# Patient Record
Sex: FEMALE | Race: WHITE | NOT HISPANIC OR LATINO | Employment: OTHER | URBAN - METROPOLITAN AREA
[De-identification: names, ages, dates, MRNs, and addresses within clinical notes are randomized per-mention and may not be internally consistent; named-entity substitution may affect disease eponyms.]

---

## 2017-02-06 ENCOUNTER — COMMUNICATION - HEALTHEAST (OUTPATIENT)
Dept: INTERNAL MEDICINE | Facility: CLINIC | Age: 62
End: 2017-02-06

## 2017-02-23 ENCOUNTER — OFFICE VISIT - HEALTHEAST (OUTPATIENT)
Dept: INTERNAL MEDICINE | Facility: CLINIC | Age: 62
End: 2017-02-23

## 2017-02-23 DIAGNOSIS — M54.2 NECK PAIN: ICD-10-CM

## 2017-02-23 ASSESSMENT — MIFFLIN-ST. JEOR: SCORE: 1168.9

## 2017-03-07 ENCOUNTER — RECORDS - HEALTHEAST (OUTPATIENT)
Dept: ADMINISTRATIVE | Facility: OTHER | Age: 62
End: 2017-03-07

## 2017-03-08 ENCOUNTER — COMMUNICATION - HEALTHEAST (OUTPATIENT)
Dept: INTERNAL MEDICINE | Facility: CLINIC | Age: 62
End: 2017-03-08

## 2017-03-09 ENCOUNTER — RECORDS - HEALTHEAST (OUTPATIENT)
Dept: ADMINISTRATIVE | Facility: OTHER | Age: 62
End: 2017-03-09

## 2017-03-24 ENCOUNTER — COMMUNICATION - HEALTHEAST (OUTPATIENT)
Dept: INTERNAL MEDICINE | Facility: CLINIC | Age: 62
End: 2017-03-24

## 2017-04-12 ENCOUNTER — COMMUNICATION - HEALTHEAST (OUTPATIENT)
Dept: SCHEDULING | Facility: CLINIC | Age: 62
End: 2017-04-12

## 2017-04-24 ENCOUNTER — COMMUNICATION - HEALTHEAST (OUTPATIENT)
Dept: INTERNAL MEDICINE | Facility: CLINIC | Age: 62
End: 2017-04-24

## 2017-10-17 ENCOUNTER — AMBULATORY - HEALTHEAST (OUTPATIENT)
Dept: NURSING | Facility: CLINIC | Age: 62
End: 2017-10-17

## 2017-10-17 DIAGNOSIS — Z23 NEED FOR VACCINATION: ICD-10-CM

## 2017-10-23 ENCOUNTER — COMMUNICATION - HEALTHEAST (OUTPATIENT)
Dept: INTERNAL MEDICINE | Facility: CLINIC | Age: 62
End: 2017-10-23

## 2017-11-26 ENCOUNTER — COMMUNICATION - HEALTHEAST (OUTPATIENT)
Dept: INTERNAL MEDICINE | Facility: CLINIC | Age: 62
End: 2017-11-26

## 2017-11-26 DIAGNOSIS — E78.5 HYPERLIPIDEMIA: ICD-10-CM

## 2017-12-20 ENCOUNTER — RECORDS - HEALTHEAST (OUTPATIENT)
Dept: ADMINISTRATIVE | Facility: OTHER | Age: 62
End: 2017-12-20

## 2017-12-28 ENCOUNTER — COMMUNICATION - HEALTHEAST (OUTPATIENT)
Dept: INTERNAL MEDICINE | Facility: CLINIC | Age: 62
End: 2017-12-28

## 2017-12-29 ENCOUNTER — OFFICE VISIT - HEALTHEAST (OUTPATIENT)
Dept: INTERNAL MEDICINE | Facility: CLINIC | Age: 62
End: 2017-12-29

## 2017-12-29 DIAGNOSIS — E03.9 HYPOTHYROIDISM: ICD-10-CM

## 2017-12-29 DIAGNOSIS — Z00.00 PREVENTATIVE HEALTH CARE: ICD-10-CM

## 2017-12-29 DIAGNOSIS — C50.919 BREAST CANCER (H): ICD-10-CM

## 2017-12-29 DIAGNOSIS — E78.5 HYPERLIPIDEMIA: ICD-10-CM

## 2017-12-29 DIAGNOSIS — J32.9 SINUSITIS: ICD-10-CM

## 2017-12-29 LAB
CHOLEST SERPL-MCNC: 228 MG/DL
FASTING STATUS PATIENT QL REPORTED: YES
HBA1C MFR BLD: 5.9 % (ref 3.5–6)
HDLC SERPL-MCNC: 67 MG/DL
LDLC SERPL CALC-MCNC: 137 MG/DL
TRIGL SERPL-MCNC: 122 MG/DL

## 2017-12-29 ASSESSMENT — MIFFLIN-ST. JEOR: SCORE: 1177.59

## 2018-01-04 ENCOUNTER — COMMUNICATION - HEALTHEAST (OUTPATIENT)
Dept: INTERNAL MEDICINE | Facility: CLINIC | Age: 63
End: 2018-01-04

## 2018-01-30 ENCOUNTER — RECORDS - HEALTHEAST (OUTPATIENT)
Dept: ADMINISTRATIVE | Facility: OTHER | Age: 63
End: 2018-01-30

## 2018-01-30 ENCOUNTER — OFFICE VISIT - HEALTHEAST (OUTPATIENT)
Dept: INTERNAL MEDICINE | Facility: CLINIC | Age: 63
End: 2018-01-30

## 2018-01-30 DIAGNOSIS — J06.9 URI (UPPER RESPIRATORY INFECTION): ICD-10-CM

## 2018-01-30 LAB — PAP SMEAR - HIM PATIENT REPORTED: NORMAL

## 2018-01-30 ASSESSMENT — MIFFLIN-ST. JEOR: SCORE: 1204.63

## 2018-01-31 ENCOUNTER — RECORDS - HEALTHEAST (OUTPATIENT)
Dept: ADMINISTRATIVE | Facility: OTHER | Age: 63
End: 2018-01-31

## 2018-02-02 ENCOUNTER — RECORDS - HEALTHEAST (OUTPATIENT)
Dept: ADMINISTRATIVE | Facility: OTHER | Age: 63
End: 2018-02-02

## 2018-02-12 ENCOUNTER — RECORDS - HEALTHEAST (OUTPATIENT)
Dept: ADMINISTRATIVE | Facility: OTHER | Age: 63
End: 2018-02-12

## 2018-02-14 ENCOUNTER — RECORDS - HEALTHEAST (OUTPATIENT)
Dept: ADMINISTRATIVE | Facility: OTHER | Age: 63
End: 2018-02-14

## 2018-02-18 ENCOUNTER — COMMUNICATION - HEALTHEAST (OUTPATIENT)
Dept: INTERNAL MEDICINE | Facility: CLINIC | Age: 63
End: 2018-02-18

## 2018-02-18 DIAGNOSIS — E03.9 HYPOTHYROIDISM: ICD-10-CM

## 2018-04-13 ENCOUNTER — AMBULATORY - HEALTHEAST (OUTPATIENT)
Dept: INTERNAL MEDICINE | Facility: CLINIC | Age: 63
End: 2018-04-13

## 2018-04-13 ENCOUNTER — COMMUNICATION - HEALTHEAST (OUTPATIENT)
Dept: INTERNAL MEDICINE | Facility: CLINIC | Age: 63
End: 2018-04-13

## 2018-10-19 ENCOUNTER — AMBULATORY - HEALTHEAST (OUTPATIENT)
Dept: NURSING | Facility: CLINIC | Age: 63
End: 2018-10-19

## 2018-11-01 ENCOUNTER — RECORDS - HEALTHEAST (OUTPATIENT)
Dept: ADMINISTRATIVE | Facility: OTHER | Age: 63
End: 2018-11-01

## 2018-11-28 ENCOUNTER — RECORDS - HEALTHEAST (OUTPATIENT)
Dept: ADMINISTRATIVE | Facility: OTHER | Age: 63
End: 2018-11-28

## 2018-11-30 ENCOUNTER — AMBULATORY - HEALTHEAST (OUTPATIENT)
Dept: INTERNAL MEDICINE | Facility: CLINIC | Age: 63
End: 2018-11-30

## 2018-11-30 ENCOUNTER — OFFICE VISIT - HEALTHEAST (OUTPATIENT)
Dept: INTERNAL MEDICINE | Facility: CLINIC | Age: 63
End: 2018-11-30

## 2018-11-30 DIAGNOSIS — Z78.0 ASYMPTOMATIC POSTMENOPAUSAL STATUS: ICD-10-CM

## 2018-11-30 DIAGNOSIS — Z17.1 MALIGNANT NEOPLASM OF LEFT BREAST IN FEMALE, ESTROGEN RECEPTOR NEGATIVE, UNSPECIFIED SITE OF BREAST (H): ICD-10-CM

## 2018-11-30 DIAGNOSIS — E03.9 HYPOTHYROIDISM: ICD-10-CM

## 2018-11-30 DIAGNOSIS — E78.5 HYPERLIPIDEMIA: ICD-10-CM

## 2018-11-30 DIAGNOSIS — E78.5 HYPERLIPIDEMIA, UNSPECIFIED HYPERLIPIDEMIA TYPE: ICD-10-CM

## 2018-11-30 DIAGNOSIS — Z00.00 PREVENTATIVE HEALTH CARE: ICD-10-CM

## 2018-11-30 DIAGNOSIS — E03.9 HYPOTHYROIDISM, UNSPECIFIED TYPE: ICD-10-CM

## 2018-11-30 DIAGNOSIS — C50.912 MALIGNANT NEOPLASM OF LEFT BREAST IN FEMALE, ESTROGEN RECEPTOR NEGATIVE, UNSPECIFIED SITE OF BREAST (H): ICD-10-CM

## 2018-11-30 DIAGNOSIS — Z80.0 FAMILY HISTORY OF COLON CANCER: ICD-10-CM

## 2018-11-30 LAB
ALBUMIN SERPL-MCNC: 4 G/DL (ref 3.5–5)
ALBUMIN UR-MCNC: NEGATIVE MG/DL
ALP SERPL-CCNC: 56 U/L (ref 45–120)
ALT SERPL W P-5'-P-CCNC: 14 U/L (ref 0–45)
ANION GAP SERPL CALCULATED.3IONS-SCNC: 12 MMOL/L (ref 5–18)
APPEARANCE UR: CLEAR
AST SERPL W P-5'-P-CCNC: 20 U/L (ref 0–40)
ATRIAL RATE - MUSE: 59 BPM
BACTERIA #/AREA URNS HPF: ABNORMAL HPF
BASOPHILS # BLD AUTO: 0 THOU/UL (ref 0–0.2)
BASOPHILS NFR BLD AUTO: 1 % (ref 0–2)
BILIRUB DIRECT SERPL-MCNC: 0.2 MG/DL
BILIRUB SERPL-MCNC: 0.7 MG/DL (ref 0–1)
BILIRUB UR QL STRIP: NEGATIVE
BUN SERPL-MCNC: 13 MG/DL (ref 8–22)
CALCIUM SERPL-MCNC: 9.8 MG/DL (ref 8.5–10.5)
CHLORIDE BLD-SCNC: 104 MMOL/L (ref 98–107)
CHOLEST SERPL-MCNC: 216 MG/DL
CK SERPL-CCNC: 84 U/L (ref 30–190)
CO2 SERPL-SCNC: 25 MMOL/L (ref 22–31)
COLOR UR AUTO: YELLOW
CREAT SERPL-MCNC: 0.84 MG/DL (ref 0.6–1.1)
DIASTOLIC BLOOD PRESSURE - MUSE: NORMAL MMHG
EOSINOPHIL # BLD AUTO: 0.1 THOU/UL (ref 0–0.4)
EOSINOPHIL NFR BLD AUTO: 2 % (ref 0–6)
ERYTHROCYTE [DISTWIDTH] IN BLOOD BY AUTOMATED COUNT: 12.4 % (ref 11–14.5)
ERYTHROCYTE [SEDIMENTATION RATE] IN BLOOD BY WESTERGREN METHOD: 16 MM/HR (ref 0–20)
FASTING STATUS PATIENT QL REPORTED: YES
GFR SERPL CREATININE-BSD FRML MDRD: >60 ML/MIN/1.73M2
GLUCOSE BLD-MCNC: 93 MG/DL (ref 70–125)
GLUCOSE UR STRIP-MCNC: NEGATIVE MG/DL
HBA1C MFR BLD: 6.1 % (ref 3.5–6)
HCT VFR BLD AUTO: 40.7 % (ref 35–47)
HDLC SERPL-MCNC: 66 MG/DL
HGB BLD-MCNC: 13.1 G/DL (ref 12–16)
HGB UR QL STRIP: ABNORMAL
INTERPRETATION ECG - MUSE: NORMAL
KETONES UR STRIP-MCNC: NEGATIVE MG/DL
LDLC SERPL CALC-MCNC: 132 MG/DL
LEUKOCYTE ESTERASE UR QL STRIP: NEGATIVE
LYMPHOCYTES # BLD AUTO: 1.7 THOU/UL (ref 0.8–4.4)
LYMPHOCYTES NFR BLD AUTO: 47 % (ref 20–40)
MCH RBC QN AUTO: 30 PG (ref 27–34)
MCHC RBC AUTO-ENTMCNC: 32.2 G/DL (ref 32–36)
MCV RBC AUTO: 93 FL (ref 80–100)
MONOCYTES # BLD AUTO: 0.4 THOU/UL (ref 0–0.9)
MONOCYTES NFR BLD AUTO: 10 % (ref 2–10)
MUCOUS THREADS #/AREA URNS LPF: ABNORMAL LPF
NEUTROPHILS # BLD AUTO: 1.5 THOU/UL (ref 2–7.7)
NEUTROPHILS NFR BLD AUTO: 40 % (ref 50–70)
NITRATE UR QL: NEGATIVE
P AXIS - MUSE: 70 DEGREES
PH UR STRIP: 5.5 [PH] (ref 4.5–8)
PLATELET # BLD AUTO: 219 THOU/UL (ref 140–440)
PMV BLD AUTO: 10.5 FL (ref 8.5–12.5)
POTASSIUM BLD-SCNC: 3.8 MMOL/L (ref 3.5–5)
PR INTERVAL - MUSE: 156 MS
PROT SERPL-MCNC: 6.8 G/DL (ref 6–8)
QRS DURATION - MUSE: 76 MS
QT - MUSE: 428 MS
QTC - MUSE: 423 MS
R AXIS - MUSE: 75 DEGREES
RBC # BLD AUTO: 4.36 MILL/UL (ref 3.8–5.4)
RBC #/AREA URNS AUTO: ABNORMAL HPF
SODIUM SERPL-SCNC: 141 MMOL/L (ref 136–145)
SP GR UR STRIP: 1.01 (ref 1–1.03)
SQUAMOUS #/AREA URNS AUTO: ABNORMAL LPF
SYSTOLIC BLOOD PRESSURE - MUSE: NORMAL MMHG
T AXIS - MUSE: 32 DEGREES
TRIGL SERPL-MCNC: 89 MG/DL
TSH SERPL DL<=0.005 MIU/L-ACNC: 1.14 UIU/ML (ref 0.3–5)
UROBILINOGEN UR STRIP-ACNC: ABNORMAL
VENTRICULAR RATE- MUSE: 59 BPM
WBC #/AREA URNS AUTO: ABNORMAL HPF
WBC: 3.7 THOU/UL (ref 4–11)

## 2018-11-30 ASSESSMENT — MIFFLIN-ST. JEOR: SCORE: 1150.76

## 2018-12-03 ENCOUNTER — COMMUNICATION - HEALTHEAST (OUTPATIENT)
Dept: INTERNAL MEDICINE | Facility: CLINIC | Age: 63
End: 2018-12-03

## 2018-12-03 LAB — 25(OH)D3 SERPL-MCNC: 48.5 NG/ML (ref 30–80)

## 2018-12-05 ENCOUNTER — COMMUNICATION - HEALTHEAST (OUTPATIENT)
Dept: INTERNAL MEDICINE | Facility: CLINIC | Age: 63
End: 2018-12-05

## 2018-12-06 ENCOUNTER — AMBULATORY - HEALTHEAST (OUTPATIENT)
Dept: NURSING | Facility: CLINIC | Age: 63
End: 2018-12-06

## 2018-12-17 ENCOUNTER — RECORDS - HEALTHEAST (OUTPATIENT)
Dept: ADMINISTRATIVE | Facility: OTHER | Age: 63
End: 2018-12-17

## 2018-12-27 ENCOUNTER — COMMUNICATION - HEALTHEAST (OUTPATIENT)
Dept: INTERNAL MEDICINE | Facility: CLINIC | Age: 63
End: 2018-12-27

## 2019-02-06 ENCOUNTER — RECORDS - HEALTHEAST (OUTPATIENT)
Dept: ADMINISTRATIVE | Facility: OTHER | Age: 64
End: 2019-02-06

## 2019-03-26 ENCOUNTER — OFFICE VISIT - HEALTHEAST (OUTPATIENT)
Dept: FAMILY MEDICINE | Facility: CLINIC | Age: 64
End: 2019-03-26

## 2019-03-26 DIAGNOSIS — H92.03 OTALGIA OF BOTH EARS: ICD-10-CM

## 2019-04-12 ENCOUNTER — COMMUNICATION - HEALTHEAST (OUTPATIENT)
Dept: SCHEDULING | Facility: CLINIC | Age: 64
End: 2019-04-12

## 2019-04-12 ENCOUNTER — COMMUNICATION - HEALTHEAST (OUTPATIENT)
Dept: INTERNAL MEDICINE | Facility: CLINIC | Age: 64
End: 2019-04-12

## 2019-04-15 ENCOUNTER — AMBULATORY - HEALTHEAST (OUTPATIENT)
Dept: NURSING | Facility: CLINIC | Age: 64
End: 2019-04-15

## 2019-06-03 ENCOUNTER — COMMUNICATION - HEALTHEAST (OUTPATIENT)
Dept: INTERNAL MEDICINE | Facility: CLINIC | Age: 64
End: 2019-06-03

## 2019-06-03 DIAGNOSIS — E78.5 HYPERLIPIDEMIA: ICD-10-CM

## 2019-06-05 ENCOUNTER — COMMUNICATION - HEALTHEAST (OUTPATIENT)
Dept: INTERNAL MEDICINE | Facility: CLINIC | Age: 64
End: 2019-06-05

## 2019-06-05 DIAGNOSIS — E78.5 HYPERLIPIDEMIA: ICD-10-CM

## 2019-06-14 ENCOUNTER — OFFICE VISIT - HEALTHEAST (OUTPATIENT)
Dept: INTERNAL MEDICINE | Facility: CLINIC | Age: 64
End: 2019-06-14

## 2019-06-14 ENCOUNTER — COMMUNICATION - HEALTHEAST (OUTPATIENT)
Dept: INTERNAL MEDICINE | Facility: CLINIC | Age: 64
End: 2019-06-14

## 2019-06-14 DIAGNOSIS — K08.89 PAIN, DENTAL: ICD-10-CM

## 2019-06-14 DIAGNOSIS — R09.81 CONGESTION OF PARANASAL SINUS: ICD-10-CM

## 2019-06-14 DIAGNOSIS — E78.5 HYPERLIPIDEMIA: ICD-10-CM

## 2019-06-14 ASSESSMENT — MIFFLIN-ST. JEOR: SCORE: 1159.83

## 2019-06-16 ENCOUNTER — OFFICE VISIT - HEALTHEAST (OUTPATIENT)
Dept: FAMILY MEDICINE | Facility: CLINIC | Age: 64
End: 2019-06-16

## 2019-06-16 DIAGNOSIS — R07.0 THROAT PAIN: ICD-10-CM

## 2019-06-16 DIAGNOSIS — J01.10 ACUTE NON-RECURRENT FRONTAL SINUSITIS: ICD-10-CM

## 2019-06-16 LAB — DEPRECATED S PYO AG THROAT QL EIA: NORMAL

## 2019-06-17 LAB — GROUP A STREP BY PCR: NORMAL

## 2019-06-18 ENCOUNTER — COMMUNICATION - HEALTHEAST (OUTPATIENT)
Dept: INTERNAL MEDICINE | Facility: CLINIC | Age: 64
End: 2019-06-18

## 2019-06-18 DIAGNOSIS — E78.5 HYPERLIPIDEMIA: ICD-10-CM

## 2019-09-11 ENCOUNTER — COMMUNICATION - HEALTHEAST (OUTPATIENT)
Dept: INTERNAL MEDICINE | Facility: CLINIC | Age: 64
End: 2019-09-11

## 2019-09-11 DIAGNOSIS — E03.9 HYPOTHYROIDISM: ICD-10-CM

## 2019-10-14 ENCOUNTER — AMBULATORY - HEALTHEAST (OUTPATIENT)
Dept: NURSING | Facility: CLINIC | Age: 64
End: 2019-10-14

## 2019-12-03 ENCOUNTER — RECORDS - HEALTHEAST (OUTPATIENT)
Dept: ADMINISTRATIVE | Facility: OTHER | Age: 64
End: 2019-12-03

## 2019-12-09 ENCOUNTER — RECORDS - HEALTHEAST (OUTPATIENT)
Dept: ADMINISTRATIVE | Facility: OTHER | Age: 64
End: 2019-12-09

## 2019-12-13 ENCOUNTER — COMMUNICATION - HEALTHEAST (OUTPATIENT)
Dept: INTERNAL MEDICINE | Facility: CLINIC | Age: 64
End: 2019-12-13

## 2019-12-13 ENCOUNTER — RECORDS - HEALTHEAST (OUTPATIENT)
Dept: ADMINISTRATIVE | Facility: OTHER | Age: 64
End: 2019-12-13

## 2019-12-13 DIAGNOSIS — E03.9 HYPOTHYROIDISM: ICD-10-CM

## 2020-01-08 ENCOUNTER — AMBULATORY - HEALTHEAST (OUTPATIENT)
Dept: INTERNAL MEDICINE | Facility: CLINIC | Age: 65
End: 2020-01-08

## 2020-01-08 ENCOUNTER — OFFICE VISIT - HEALTHEAST (OUTPATIENT)
Dept: INTERNAL MEDICINE | Facility: CLINIC | Age: 65
End: 2020-01-08

## 2020-01-08 DIAGNOSIS — E03.9 HYPOTHYROIDISM, UNSPECIFIED TYPE: ICD-10-CM

## 2020-01-08 DIAGNOSIS — Z00.00 ANNUAL PHYSICAL EXAM: ICD-10-CM

## 2020-01-08 LAB
ALBUMIN SERPL-MCNC: 4.1 G/DL (ref 3.5–5)
ALBUMIN UR-MCNC: NEGATIVE MG/DL
ALP SERPL-CCNC: 62 U/L (ref 45–120)
ALT SERPL W P-5'-P-CCNC: 19 U/L (ref 0–45)
ANION GAP SERPL CALCULATED.3IONS-SCNC: 12 MMOL/L (ref 5–18)
APPEARANCE UR: CLEAR
AST SERPL W P-5'-P-CCNC: 22 U/L (ref 0–40)
ATRIAL RATE - MUSE: 61 BPM
BACTERIA #/AREA URNS HPF: ABNORMAL HPF
BASOPHILS # BLD AUTO: 0 THOU/UL (ref 0–0.2)
BASOPHILS NFR BLD AUTO: 1 % (ref 0–2)
BILIRUB DIRECT SERPL-MCNC: 0.2 MG/DL
BILIRUB SERPL-MCNC: 0.6 MG/DL (ref 0–1)
BILIRUB UR QL STRIP: NEGATIVE
BUN SERPL-MCNC: 14 MG/DL (ref 8–22)
CALCIUM SERPL-MCNC: 9.8 MG/DL (ref 8.5–10.5)
CHLORIDE BLD-SCNC: 104 MMOL/L (ref 98–107)
CHOLEST SERPL-MCNC: 241 MG/DL
CO2 SERPL-SCNC: 25 MMOL/L (ref 22–31)
COLOR UR AUTO: YELLOW
CREAT SERPL-MCNC: 0.83 MG/DL (ref 0.6–1.1)
DIASTOLIC BLOOD PRESSURE - MUSE: NORMAL
EOSINOPHIL # BLD AUTO: 0.1 THOU/UL (ref 0–0.4)
EOSINOPHIL NFR BLD AUTO: 2 % (ref 0–6)
ERYTHROCYTE [DISTWIDTH] IN BLOOD BY AUTOMATED COUNT: 12.7 % (ref 11–14.5)
FASTING STATUS PATIENT QL REPORTED: YES
GFR SERPL CREATININE-BSD FRML MDRD: >60 ML/MIN/1.73M2
GLUCOSE BLD-MCNC: 84 MG/DL (ref 70–125)
GLUCOSE UR STRIP-MCNC: NEGATIVE MG/DL
HBA1C MFR BLD: 6.2 % (ref 3.5–6)
HCT VFR BLD AUTO: 40.7 % (ref 35–47)
HDLC SERPL-MCNC: 75 MG/DL
HGB BLD-MCNC: 13.8 G/DL (ref 12–16)
HGB UR QL STRIP: ABNORMAL
INTERPRETATION ECG - MUSE: NORMAL
KETONES UR STRIP-MCNC: ABNORMAL MG/DL
LDLC SERPL CALC-MCNC: 145 MG/DL
LEUKOCYTE ESTERASE UR QL STRIP: NEGATIVE
LYMPHOCYTES # BLD AUTO: 2.2 THOU/UL (ref 0.8–4.4)
LYMPHOCYTES NFR BLD AUTO: 34 % (ref 20–40)
MCH RBC QN AUTO: 30.5 PG (ref 27–34)
MCHC RBC AUTO-ENTMCNC: 34 G/DL (ref 32–36)
MCV RBC AUTO: 90 FL (ref 80–100)
MONOCYTES # BLD AUTO: 0.5 THOU/UL (ref 0–0.9)
MONOCYTES NFR BLD AUTO: 7 % (ref 2–10)
MUCOUS THREADS #/AREA URNS LPF: ABNORMAL LPF
NEUTROPHILS # BLD AUTO: 3.7 THOU/UL (ref 2–7.7)
NEUTROPHILS NFR BLD AUTO: 56 % (ref 50–70)
NITRATE UR QL: NEGATIVE
P AXIS - MUSE: 76 DEGREES
PH UR STRIP: 5.5 [PH] (ref 5–8)
PLATELET # BLD AUTO: 254 THOU/UL (ref 140–440)
PMV BLD AUTO: 7.6 FL (ref 7–10)
POTASSIUM BLD-SCNC: 4 MMOL/L (ref 3.5–5)
PR INTERVAL - MUSE: 148 MS
PROT SERPL-MCNC: 6.9 G/DL (ref 6–8)
QRS DURATION - MUSE: 74 MS
QT - MUSE: 422 MS
QTC - MUSE: 424 MS
R AXIS - MUSE: 79 DEGREES
RBC # BLD AUTO: 4.54 MILL/UL (ref 3.8–5.4)
RBC #/AREA URNS AUTO: ABNORMAL HPF
SODIUM SERPL-SCNC: 141 MMOL/L (ref 136–145)
SP GR UR STRIP: 1.02 (ref 1–1.03)
SQUAMOUS #/AREA URNS AUTO: ABNORMAL LPF
SYSTOLIC BLOOD PRESSURE - MUSE: NORMAL
T AXIS - MUSE: 44 DEGREES
TRIGL SERPL-MCNC: 105 MG/DL
TSH SERPL DL<=0.005 MIU/L-ACNC: 1.09 UIU/ML (ref 0.3–5)
UROBILINOGEN UR STRIP-ACNC: ABNORMAL
VENTRICULAR RATE- MUSE: 61 BPM
WBC #/AREA URNS AUTO: ABNORMAL HPF
WBC: 6.5 THOU/UL (ref 4–11)

## 2020-01-08 ASSESSMENT — MIFFLIN-ST. JEOR: SCORE: 1132.8

## 2020-01-09 LAB
25(OH)D3 SERPL-MCNC: 39.7 NG/ML (ref 30–80)
25(OH)D3 SERPL-MCNC: 39.7 NG/ML (ref 30–80)

## 2020-01-16 ENCOUNTER — COMMUNICATION - HEALTHEAST (OUTPATIENT)
Dept: INTERNAL MEDICINE | Facility: CLINIC | Age: 65
End: 2020-01-16

## 2020-02-18 ENCOUNTER — COMMUNICATION - HEALTHEAST (OUTPATIENT)
Dept: INTERNAL MEDICINE | Facility: CLINIC | Age: 65
End: 2020-02-18

## 2020-03-28 ENCOUNTER — COMMUNICATION - HEALTHEAST (OUTPATIENT)
Dept: SCHEDULING | Facility: CLINIC | Age: 65
End: 2020-03-28

## 2020-04-20 ENCOUNTER — AMBULATORY - HEALTHEAST (OUTPATIENT)
Dept: INTERNAL MEDICINE | Facility: CLINIC | Age: 65
End: 2020-04-20

## 2020-04-20 ENCOUNTER — COMMUNICATION - HEALTHEAST (OUTPATIENT)
Dept: INTERNAL MEDICINE | Facility: CLINIC | Age: 65
End: 2020-04-20

## 2020-05-05 ENCOUNTER — COMMUNICATION - HEALTHEAST (OUTPATIENT)
Dept: INTERNAL MEDICINE | Facility: CLINIC | Age: 65
End: 2020-05-05

## 2020-05-05 DIAGNOSIS — E78.5 HYPERLIPIDEMIA, UNSPECIFIED HYPERLIPIDEMIA TYPE: ICD-10-CM

## 2020-08-03 ENCOUNTER — RECORDS - HEALTHEAST (OUTPATIENT)
Dept: HEALTH INFORMATION MANAGEMENT | Facility: CLINIC | Age: 65
End: 2020-08-03

## 2020-10-07 ENCOUNTER — OFFICE VISIT - HEALTHEAST (OUTPATIENT)
Dept: INTERNAL MEDICINE | Facility: CLINIC | Age: 65
End: 2020-10-07

## 2020-10-07 ENCOUNTER — AMBULATORY - HEALTHEAST (OUTPATIENT)
Dept: INTERNAL MEDICINE | Facility: CLINIC | Age: 65
End: 2020-10-07

## 2020-10-07 DIAGNOSIS — M85.80 OSTEOPENIA, UNSPECIFIED LOCATION: ICD-10-CM

## 2020-10-07 DIAGNOSIS — E78.2 MIXED HYPERLIPIDEMIA: ICD-10-CM

## 2020-10-07 DIAGNOSIS — Z00.00 HEALTHCARE MAINTENANCE: ICD-10-CM

## 2020-10-07 DIAGNOSIS — I25.10 ASCVD (ARTERIOSCLEROTIC CARDIOVASCULAR DISEASE): ICD-10-CM

## 2020-10-07 RX ORDER — SACCHAROMYCES BOULARDII 250 MG
250 CAPSULE ORAL DAILY
Status: SHIPPED | COMMUNITY
Start: 2020-10-07

## 2020-10-08 ENCOUNTER — AMBULATORY - HEALTHEAST (OUTPATIENT)
Dept: NURSING | Facility: CLINIC | Age: 65
End: 2020-10-08

## 2020-10-15 ENCOUNTER — HOSPITAL ENCOUNTER (OUTPATIENT)
Dept: CT IMAGING | Facility: CLINIC | Age: 65
Discharge: HOME | End: 2020-10-15
Attending: INTERNAL MEDICINE

## 2020-10-15 DIAGNOSIS — I25.10 ASCVD (ARTERIOSCLEROTIC CARDIOVASCULAR DISEASE): ICD-10-CM

## 2020-10-15 LAB
CV CALCIUM SCORE AGATSTON LM: 0
CV CALCIUM SCORING AGATSON LAD: 0
CV CALCIUM SCORING AGATSTON CX: 0
CV CALCIUM SCORING AGATSTON RCA: 0
CV CALCIUM SCORING AGATSTON TOTAL: 0

## 2020-10-17 ENCOUNTER — COMMUNICATION - HEALTHEAST (OUTPATIENT)
Dept: INTERNAL MEDICINE | Facility: CLINIC | Age: 65
End: 2020-10-17

## 2020-10-29 ENCOUNTER — RECORDS - HEALTHEAST (OUTPATIENT)
Dept: ADMINISTRATIVE | Facility: OTHER | Age: 65
End: 2020-10-29

## 2020-11-11 ENCOUNTER — RECORDS - HEALTHEAST (OUTPATIENT)
Dept: ADMINISTRATIVE | Facility: OTHER | Age: 65
End: 2020-11-11

## 2020-12-09 ENCOUNTER — RECORDS - HEALTHEAST (OUTPATIENT)
Dept: ADMINISTRATIVE | Facility: OTHER | Age: 65
End: 2020-12-09

## 2020-12-10 ENCOUNTER — COMMUNICATION - HEALTHEAST (OUTPATIENT)
Dept: INTERNAL MEDICINE | Facility: CLINIC | Age: 65
End: 2020-12-10

## 2020-12-10 DIAGNOSIS — E03.9 HYPOTHYROIDISM: ICD-10-CM

## 2020-12-10 RX ORDER — LEVOTHYROXINE SODIUM 50 UG/1
50 TABLET ORAL DAILY
Qty: 90 TABLET | Refills: 3 | Status: SHIPPED | OUTPATIENT
Start: 2020-12-10 | End: 2021-09-21

## 2020-12-15 ENCOUNTER — RECORDS - HEALTHEAST (OUTPATIENT)
Dept: ADMINISTRATIVE | Facility: OTHER | Age: 65
End: 2020-12-15

## 2021-02-02 ENCOUNTER — OFFICE VISIT - HEALTHEAST (OUTPATIENT)
Dept: INTERNAL MEDICINE | Facility: CLINIC | Age: 66
End: 2021-02-02

## 2021-02-02 DIAGNOSIS — Z00.00 HEALTHCARE MAINTENANCE: ICD-10-CM

## 2021-02-02 DIAGNOSIS — C50.912 MALIGNANT NEOPLASM OF LEFT BREAST IN FEMALE, ESTROGEN RECEPTOR NEGATIVE, UNSPECIFIED SITE OF BREAST (H): ICD-10-CM

## 2021-02-02 DIAGNOSIS — E03.9 HYPOTHYROIDISM, UNSPECIFIED TYPE: ICD-10-CM

## 2021-02-02 DIAGNOSIS — E78.5 HYPERLIPIDEMIA, UNSPECIFIED HYPERLIPIDEMIA TYPE: ICD-10-CM

## 2021-02-02 DIAGNOSIS — Z17.1 MALIGNANT NEOPLASM OF LEFT BREAST IN FEMALE, ESTROGEN RECEPTOR NEGATIVE, UNSPECIFIED SITE OF BREAST (H): ICD-10-CM

## 2021-02-02 DIAGNOSIS — Z00.00 ROUTINE HISTORY AND PHYSICAL EXAMINATION OF ADULT: ICD-10-CM

## 2021-02-02 DIAGNOSIS — E78.2 MIXED HYPERLIPIDEMIA: ICD-10-CM

## 2021-02-02 LAB
ALBUMIN SERPL-MCNC: 4.2 G/DL (ref 3.5–5)
ALP SERPL-CCNC: 60 U/L (ref 45–120)
ALT SERPL W P-5'-P-CCNC: 14 U/L (ref 0–45)
ANION GAP SERPL CALCULATED.3IONS-SCNC: 10 MMOL/L (ref 5–18)
AST SERPL W P-5'-P-CCNC: 18 U/L (ref 0–40)
BASOPHILS # BLD AUTO: 0 THOU/UL (ref 0–0.2)
BASOPHILS NFR BLD AUTO: 1 % (ref 0–2)
BILIRUB SERPL-MCNC: 0.5 MG/DL (ref 0–1)
BUN SERPL-MCNC: 14 MG/DL (ref 8–22)
CALCIUM SERPL-MCNC: 9.7 MG/DL (ref 8.5–10.5)
CHLORIDE BLD-SCNC: 105 MMOL/L (ref 98–107)
CHOLEST SERPL-MCNC: 225 MG/DL
CO2 SERPL-SCNC: 27 MMOL/L (ref 22–31)
CREAT SERPL-MCNC: 0.84 MG/DL (ref 0.6–1.1)
EOSINOPHIL # BLD AUTO: 0.1 THOU/UL (ref 0–0.4)
EOSINOPHIL NFR BLD AUTO: 2 % (ref 0–6)
ERYTHROCYTE [DISTWIDTH] IN BLOOD BY AUTOMATED COUNT: 12.5 % (ref 11–14.5)
FASTING STATUS PATIENT QL REPORTED: YES
GFR SERPL CREATININE-BSD FRML MDRD: >60 ML/MIN/1.73M2
GLUCOSE BLD-MCNC: 90 MG/DL (ref 70–125)
HBA1C MFR BLD: 5.9 %
HCT VFR BLD AUTO: 43.5 % (ref 35–47)
HDLC SERPL-MCNC: 64 MG/DL
HGB BLD-MCNC: 13.9 G/DL (ref 12–16)
IMM GRANULOCYTES # BLD: 0 THOU/UL
IMM GRANULOCYTES NFR BLD: 0 %
LDLC SERPL CALC-MCNC: 139 MG/DL
LYMPHOCYTES # BLD AUTO: 2 THOU/UL (ref 0.8–4.4)
LYMPHOCYTES NFR BLD AUTO: 46 % (ref 20–40)
MCH RBC QN AUTO: 29.3 PG (ref 27–34)
MCHC RBC AUTO-ENTMCNC: 32 G/DL (ref 32–36)
MCV RBC AUTO: 92 FL (ref 80–100)
MONOCYTES # BLD AUTO: 0.5 THOU/UL (ref 0–0.9)
MONOCYTES NFR BLD AUTO: 11 % (ref 2–10)
NEUTROPHILS # BLD AUTO: 1.8 THOU/UL (ref 2–7.7)
NEUTROPHILS NFR BLD AUTO: 40 % (ref 50–70)
PLATELET # BLD AUTO: 240 THOU/UL (ref 140–440)
PMV BLD AUTO: 9.4 FL (ref 7–10)
POTASSIUM BLD-SCNC: 4.5 MMOL/L (ref 3.5–5)
PROT SERPL-MCNC: 7.2 G/DL (ref 6–8)
RBC # BLD AUTO: 4.74 MILL/UL (ref 3.8–5.4)
SODIUM SERPL-SCNC: 142 MMOL/L (ref 136–145)
TRIGL SERPL-MCNC: 112 MG/DL
TSH SERPL DL<=0.005 MIU/L-ACNC: 1.23 UIU/ML (ref 0.3–5)
WBC: 4.4 THOU/UL (ref 4–11)

## 2021-02-02 RX ORDER — ROSUVASTATIN CALCIUM 5 MG/1
5 TABLET, COATED ORAL DAILY
Qty: 90 TABLET | Refills: 3 | Status: SHIPPED | OUTPATIENT
Start: 2021-02-02 | End: 2022-02-02

## 2021-02-02 ASSESSMENT — MIFFLIN-ST. JEOR: SCORE: 1164.36

## 2021-02-03 LAB
25(OH)D3 SERPL-MCNC: 47.5 NG/ML (ref 30–80)
25(OH)D3 SERPL-MCNC: 47.5 NG/ML (ref 30–80)

## 2021-03-23 ENCOUNTER — COMMUNICATION - HEALTHEAST (OUTPATIENT)
Dept: INTERNAL MEDICINE | Facility: CLINIC | Age: 66
End: 2021-03-23

## 2021-03-27 ENCOUNTER — AMBULATORY - HEALTHEAST (OUTPATIENT)
Dept: NURSING | Facility: CLINIC | Age: 66
End: 2021-03-27

## 2021-04-17 ENCOUNTER — AMBULATORY - HEALTHEAST (OUTPATIENT)
Dept: NURSING | Facility: CLINIC | Age: 66
End: 2021-04-17

## 2021-05-24 ENCOUNTER — RECORDS - HEALTHEAST (OUTPATIENT)
Dept: ADMINISTRATIVE | Facility: CLINIC | Age: 66
End: 2021-05-24

## 2021-05-25 ENCOUNTER — RECORDS - HEALTHEAST (OUTPATIENT)
Dept: ADMINISTRATIVE | Facility: CLINIC | Age: 66
End: 2021-05-25

## 2021-05-26 ENCOUNTER — RECORDS - HEALTHEAST (OUTPATIENT)
Dept: ADMINISTRATIVE | Facility: CLINIC | Age: 66
End: 2021-05-26

## 2021-05-26 VITALS — HEART RATE: 89 BPM | DIASTOLIC BLOOD PRESSURE: 76 MMHG | SYSTOLIC BLOOD PRESSURE: 127 MMHG

## 2021-05-27 ENCOUNTER — RECORDS - HEALTHEAST (OUTPATIENT)
Dept: ADMINISTRATIVE | Facility: CLINIC | Age: 66
End: 2021-05-27

## 2021-05-27 NOTE — TELEPHONE ENCOUNTER
Spoke with the patient and helped her to schedule shingrix vaccine for 4/15/19 at 9:30 am.  Patient had no further questions at this time.  Sonja ERNANDEZ CMA/DICKSON....................3:25 PM

## 2021-05-27 NOTE — PROGRESS NOTES
Immunizations     Name       ZOSTER, RECOMBINANT, IM       Patient in clinic today for her 2nd shingrix vaccine.  Patient tolerated the vaccine well and had no further questions at this time.  Sonja ERNANDEZ CMA/DICKSON....................9:37 AM

## 2021-05-27 NOTE — TELEPHONE ENCOUNTER
Question;    What is the maximum time she can wait until she gets her 2nd shingrix  Vaccine.    2-6 months      patient notified.      Yoana Cowart RN  [facility] Triage/refill nurse

## 2021-05-27 NOTE — PROGRESS NOTES
Chief Complaint   Patient presents with     Ear Pain     x1, unsusal pain, itchy, both ears, worst on the right ear         HPI      Patient is here for a few days of bilateral ear pain R>L. No fever, cough, nasal congestion, sore throat. No pain during visit.    ROS: Pertinent ROS noted in HPI.     Allergies   Allergen Reactions     Lipitor [Atorvastatin] Myalgia     Statins-Hmg-Coa Reductase Inhibitors Myalgia       Patient Active Problem List   Diagnosis     Hyperlipidemia     Postmenopausal     Breast cancer (H)       Family History   Problem Relation Age of Onset     Heart attack Father      Diabetes Mother      Osteoarthritis Mother      Cancer Sister         breast     Hyperlipidemia Sister      Hypothyroidism Sister      Hyperlipidemia Brother        Social History     Socioeconomic History     Marital status:      Spouse name: Not on file     Number of children: Not on file     Years of education: Not on file     Highest education level: Not on file   Occupational History     Not on file   Social Needs     Financial resource strain: Not on file     Food insecurity:     Worry: Not on file     Inability: Not on file     Transportation needs:     Medical: Not on file     Non-medical: Not on file   Tobacco Use     Smoking status: Never Smoker     Smokeless tobacco: Never Used   Substance and Sexual Activity     Alcohol use: No     Drug use: No     Sexual activity: Not on file   Lifestyle     Physical activity:     Days per week: Not on file     Minutes per session: Not on file     Stress: Not on file   Relationships     Social connections:     Talks on phone: Not on file     Gets together: Not on file     Attends [employer] service: Not on file     Active member of club or organization: Not on file     Attends meetings of clubs or organizations: Not on file     Relationship status: Not on file     Intimate partner violence:     Fear of current or ex partner: Not on file     Emotionally abused: Not on file      Physically abused: Not on file     Forced sexual activity: Not on file   Other Topics Concern     Not on file   Social History Narrative    Adin-diabetes    Kena Ogden    Native of [facility]                 Objective:    Vitals:    03/26/19 0950   BP: 149/87   Pulse: 61   Resp: 18   Temp: 98  F (36.7  C)   SpO2: 97%       Gen:NAD  Throat: oropharynx clear, tonsils normal  Ears: TMs clear without effusion, ear canals normal with small cerumen  Nose: no discharge  Neck: No adenopathy  CV: RRR, normal S1S2, no M, R, G  Pulm: CTAB, normal effort    Assessment:    Otalgia of both ears - normal exam.     Plan:    Patient was reassured there is no pathologies noted in her ears.   Advised OTC analgesics prn.  F/u with PCP if symptoms persist/escalate.

## 2021-05-29 NOTE — PROGRESS NOTES
Internal Medicine Office Visit  [facility]  Patient Name: Skylar Spaulding  Patient Age: 63 y.o.  YOB: 1955  MRN: [id number]    Date of Visit: 2019  Reason for Office Visit:   Chief Complaint   Patient presents with     Sinus Problem     Facial pain and pressure started on the right side of face and runny nose. Headaches, ear pain/popping, and sore throat. Felt warm but temp was normal at home.            Assessment / Plan / Medical Decision Makin. Pain, dental  - amoxicillin (AMOXIL) 875 MG tablet; Take 1 tablet (875 mg total) by mouth 2 (two) times a day for 10 days.  Dispense: 20 tablet; Refill: 0  Recommend that patient contact her dentist for possible imaging and evaluation  2. Congestion of paranasal sinus  Recommend saline sinus rinse, fluticasone nasal spray 1 spray each nostril daily, she certainly may utilize an over-the-counter decongestant utilization of over-the-counter pain reliever as needed    Patient advised if symptoms persist, worsen or new symptoms arise they are to seek medical care.  All patients questions addressed. Patient verbalized understanding and agreement with plan.     No orders of the defined types were placed in this encounter.  Followup: Return in about 1 week (around 2019), or if symptoms worsen or fail to improve. earlier if needed.    Health Maintenance Review  Health Maintenance   Topic Date Due     ADVANCE DIRECTIVES DISCUSSED WITH PATIENT  10/01/2019     PAP SMEAR  10/30/2019     MAMMOGRAM  2020     TD 18+ HE  10/31/2022     COLONOSCOPY  10/10/2023     INFLUENZA VACCINE RULE BASED  Completed     TDAP ADULT ONE TIME DOSE  Completed     ZOSTER VACCINES  Completed         I am having Skylar Spaulding start on amoxicillin. I am also having her maintain her aspirin, cholecalciferol (vitamin D3), calcium carbonate-vitamin D2, multivitamin therapeutic, levothyroxine, and rosuvastatin.      HPI:  Skylar HOPSON  aJsson is a 63 y.o. year old who presents to the office today with a chief concern of facial pain pressure began on the right side of the face, runny nose, headache, ears popping and pain sore throat.  Patient reports feeling warm but no temp was noted at home today.  Patient reports that initially started over the weekend she is unsure if it is some dental pain is been greater than 6 months and she is been seen by the dentist and did have some work done at that time.  She states that her nose runs clear nasal drainage postnasal drainage with sore throat, she is to utilize some over-the-counter DayQuil does not find relief of symptoms has utilize some ibuprofen does find some relief with her headache and dental discomfort        Review of Systems- pertinent positive in bold:  Constitutional: Fever, chills, night sweats, fainting, weight change, fatigue, dizziness, sleeping difficulties, loud snoring/pauses in breathing  Eyes: change in vision, blurred or double vision, redness/eye pain  Ears, nose, mouth, throat: change in hearing, ear pain, hoarseness, difficulty swallowing, sores in the mouth or throat  Respiratory: shortness of breath, cough, bloody sputum, wheezing  Cardiovascular: chest pain, palpitations   Skin: change in moles/freckles, rash, nodules        Current Scheduled Meds:  Outpatient Encounter Medications as of 6/14/2019   Medication Sig Dispense Refill     aspirin 81 MG EC tablet Take 81 mg by mouth daily.       calcium carbonate-vitamin D2 500 mg(1,250mg) -200 unit tablet Take 1 tablet by mouth 3 (three) times a day.       cholecalciferol, vitamin D3, (VITAMIN D3) 2,000 unit cap Take 1,000 Units by mouth daily.        levothyroxine (SYNTHROID, LEVOTHROID) 50 MCG tablet TAKE 1 TABLET BY MOUTH DAILY. 100 tablet 2     multivitamin therapeutic (THERAGRAN) tablet Take 1 tablet by mouth daily.       rosuvastatin (CRESTOR) 10 MG tablet TAKE 0.5 TABLETS BY MOUTH EVERY DAY 45 tablet 1     amoxicillin  "(AMOXIL) 875 MG tablet Take 1 tablet (875 mg total) by mouth 2 (two) times a day for 10 days. 20 tablet 0     No facility-administered encounter medications on file as of 6/14/2019.      Past Medical History:   Diagnosis Date     Benign positional vertigo      Breast cancer (H)      ER- [place]+ carcinoma of breast (H)      Hyperlipidemia      Menopause      Microscopic hematuria      Osteoarthritis      Past Surgical History:   Procedure Laterality Date     BREAST BIOPSY      Left     BREAST LUMPECTOMY      left     DENTAL SURGERY       POLYPECTOMY      uterine polyp      Social History     Tobacco Use     Smoking status: Never Smoker     Smokeless tobacco: Never Used   Substance Use Topics     Alcohol use: No     Drug use: No       Objective / Physical Examination:  Vitals:    06/14/19 0818   BP: 138/82   Pulse: 67   Resp: 16   Temp: 97.6  F (36.4  C)   SpO2: 98%   Weight: 136 lb (61.7 kg)   Height: 5' 4.5\" (1.638 m)     Wt Readings from Last 3 Encounters:   06/14/19 136 lb (61.7 kg)   03/26/19 133 lb 5 oz (60.5 kg)   11/30/18 134 lb (60.8 kg)     Body mass index is 22.98 kg/m .     General Appearance: Alert and oriented, cooperative, affect appropriate, speech clear, in no apparent distress  Head: Normocephalic, atraumatic  Ears: Tympanic membrane clear with landmarks well visualized bilaterally  Eyes:   Conjunctivae clear and sclerae non-icteric  Nose: Septum midline, nares patent,  mucosa moist clear nasal drainage noted  Throat: Lips and mucosa moist, posterior pharynx mild erythema without exudate postnasal drainage noted.  Patient does note discomfort along the upper right dental  Neck: Supple, trachea midline. No cervical adenopathy  Lungs: Clear to auscultation bilaterally. No adventitious lung sounds noted. Normal inspiratory and expiratory effort  Cardiovascular: Regular rate, normal S1, S2. No murmurs, rubs, or gallops  Integumentary: Warm and dry. Without suspicious looking lesions  Neuro: Alert and " oriented, follows commands appropriately.     No results found.  No results found for this or any previous visit (from the past 240 hour(s)).          Clover Guo, CNP

## 2021-05-29 NOTE — TELEPHONE ENCOUNTER
Refill Approved    Rx renewed per Medication Renewal Policy. Medication was last renewed on 11/30/18.    Abigail Kerr, [facility] Triage/Med Refill 6/4/2019     Requested Prescriptions   Pending Prescriptions Disp Refills     rosuvastatin (CRESTOR) 10 MG tablet [Pharmacy Med Name: ROSUVASTATIN 10MG TABLETS] 100 tablet 0     Sig: TAKE 0.5 TABLETS BY MOUTH EVERY DAY       Statins Refill Protocol (Hmg CoA Reductase Inhibitors) Passed - 6/3/2019  3:30 PM        Passed - PCP or prescribing provider visit in past 12 months      Last office visit with prescriber/PCP: 2/23/2017 Royal Faith MD OR same dept: Visit date not found OR same specialty: 1/30/2018 Wolf Casillas MD  Last physical: 11/30/2018 Last MTM visit: Visit date not found   Next visit within 3 mo: Visit date not found  Next physical within 3 mo: Visit date not found  Prescriber OR PCP: Royal Faith MD  Last diagnosis associated with med order: 1. Hyperlipidemia  - rosuvastatin (CRESTOR) 10 MG tablet [Pharmacy Med Name: ROSUVASTATIN 10MG TABLETS]; TAKE 0.5 TABLETS BY MOUTH EVERY DAY  Dispense: 100 tablet; Refill: 0    If protocol passes may refill for 12 months if within 3 months of last provider visit (or a total of 15 months).

## 2021-05-29 NOTE — TELEPHONE ENCOUNTER
RN cannot approve Refill Request    RN can NOT refill this medication The source prescription was reordered on 6/4/2019 by Abigail Kerr RN.. Last office visit: 2/23/2017 Royal Faith MD Last Physical: 11/30/2018 Last MTM visit: Visit date not found Last visit same specialty: 6/14/2019 Clover Guo CNP.  Next visit within 3 mo: Visit date not found  Next physical within 3 mo: Visit date not found      Kade Hicks [facility] Triage/Med Refill 6/16/2019    Requested Prescriptions   Pending Prescriptions Disp Refills     rosuvastatin (CRESTOR) 10 MG tablet [Pharmacy Med Name: ROSUVASTATIN 10MG TABLETS] 100 tablet 0     Sig: TAKE 0.5 TABLETS BY MOUTH EVERY DAY       Statins Refill Protocol (Hmg CoA Reductase Inhibitors) Passed - 6/14/2019  9:04 AM        Passed - PCP or prescribing provider visit in past 12 months      Last office visit with prescriber/PCP: 2/23/2017 Royal Faith MD OR same dept: Visit date not found OR same specialty: 6/14/2019 Clover Guo CNP  Last physical: 11/30/2018 Last MTM visit: Visit date not found   Next visit within 3 mo: Visit date not found  Next physical within 3 mo: Visit date not found  Prescriber OR PCP: Royal Faith MD  Last diagnosis associated with med order: 1. Hyperlipidemia  - rosuvastatin (CRESTOR) 10 MG tablet [Pharmacy Med Name: ROSUVASTATIN 10MG TABLETS]; TAKE 0.5 TABLETS BY MOUTH EVERY DAY  Dispense: 100 tablet; Refill: 0    If protocol passes may refill for 12 months if within 3 months of last provider visit (or a total of 15 months).

## 2021-05-30 VITALS — WEIGHT: 138 LBS | BODY MASS INDEX: 22.99 KG/M2 | HEIGHT: 65 IN

## 2021-05-31 VITALS — BODY MASS INDEX: 24.16 KG/M2 | HEIGHT: 65 IN | WEIGHT: 145 LBS

## 2021-05-31 VITALS — WEIGHT: 139.04 LBS | BODY MASS INDEX: 23.17 KG/M2 | HEIGHT: 65 IN

## 2021-06-01 NOTE — TELEPHONE ENCOUNTER
Refill Approved    Rx renewed per Medication Renewal Policy. Medication was last renewed on 11/30/18.    Lynne Lozada, [facility] Triage/Med Refill 9/12/2019     Requested Prescriptions   Pending Prescriptions Disp Refills     levothyroxine (SYNTHROID, LEVOTHROID) 50 MCG tablet 100 tablet 2     Sig: TAKE 1 TABLET BY MOUTH DAILY       Thyroid Hormones Protocol Passed - 9/11/2019  2:15 PM        Passed - Provider visit in past 12 months or next 3 months     Last office visit with prescriber/PCP: 2/23/2017 Royal Faith MD OR same dept: Visit date not found OR same specialty: 6/14/2019 Clover Guo, CNP  Last physical: 11/30/2018 Last MTM visit: Visit date not found   Next visit within 3 mo: Visit date not found  Next physical within 3 mo: Visit date not found  Prescriber OR PCP: Royla Faith MD  Last diagnosis associated with med order: 1. Hypothyroidism  - levothyroxine (SYNTHROID, LEVOTHROID) 50 MCG tablet; TAKE 1 TABLET BY MOUTH DAILY  Dispense: 100 tablet; Refill: 2    If protocol passes may refill for 12 months if within 3 months of last provider visit (or a total of 15 months).             Passed - TSH on file in past 12 months for patient age 12 & older     TSH   Date Value Ref Range Status   11/30/2018 1.14 0.30 - 5.00 uIU/mL Final

## 2021-06-02 VITALS — WEIGHT: 134 LBS | BODY MASS INDEX: 22.33 KG/M2 | HEIGHT: 65 IN

## 2021-06-02 VITALS — BODY MASS INDEX: 22.53 KG/M2 | WEIGHT: 133.31 LBS

## 2021-06-03 VITALS — WEIGHT: 138 LBS | BODY MASS INDEX: 23.32 KG/M2

## 2021-06-03 VITALS — HEIGHT: 65 IN | WEIGHT: 136 LBS | BODY MASS INDEX: 22.66 KG/M2

## 2021-06-04 VITALS
HEART RATE: 66 BPM | BODY MASS INDEX: 21.67 KG/M2 | SYSTOLIC BLOOD PRESSURE: 136 MMHG | WEIGHT: 130.04 LBS | OXYGEN SATURATION: 98 % | DIASTOLIC BLOOD PRESSURE: 82 MMHG | HEIGHT: 65 IN

## 2021-06-04 NOTE — TELEPHONE ENCOUNTER
RN cannot approve Refill Request    RN can NOT refill this medication Protocol failed and NO refill given.       Abigail Kerr, [facility] Triage/Med Refill 12/15/2019    Requested Prescriptions   Pending Prescriptions Disp Refills     levothyroxine (SYNTHROID, LEVOTHROID) 50 MCG tablet [Pharmacy Med Name: LEVOTHYROXINE 0.05MG (50MCG) TAB] 90 tablet 0     Sig: TAKE 1 TABLET BY MOUTH DAILY       Thyroid Hormones Protocol Failed - 12/13/2019  1:23 PM        Failed - Provider visit in past 12 months or next 3 months     Last office visit with prescriber/PCP: 2/23/2017 Royal Faith MD OR same dept: Visit date not found OR same specialty: 6/14/2019 Clover Guo, CNP  Last physical: 11/30/2018 Last MTM visit: Visit date not found   Next visit within 3 mo: Visit date not found  Next physical within 3 mo: Visit date not found  Prescriber OR PCP: Royal Faith MD  Last diagnosis associated with med order: 1. Hypothyroidism  - levothyroxine (SYNTHROID, LEVOTHROID) 50 MCG tablet [Pharmacy Med Name: LEVOTHYROXINE 0.05MG (50MCG) TAB]; TAKE 1 TABLET BY MOUTH DAILY  Dispense: 90 tablet; Refill: 0    If protocol passes may refill for 12 months if within 3 months of last provider visit (or a total of 15 months).             Failed - TSH on file in past 12 months for patient age 12 & older     TSH   Date Value Ref Range Status   11/30/2018 1.14 0.30 - 5.00 uIU/mL Final

## 2021-06-05 VITALS
SYSTOLIC BLOOD PRESSURE: 124 MMHG | WEIGHT: 133.5 LBS | HEIGHT: 66 IN | BODY MASS INDEX: 21.45 KG/M2 | OXYGEN SATURATION: 99 % | HEART RATE: 76 BPM | DIASTOLIC BLOOD PRESSURE: 78 MMHG

## 2021-06-05 NOTE — PATIENT INSTRUCTIONS - HE
Combined calcium and vitamin D supplementation appears to reduce fracture risk in older adults.  ?The optimal intake of calcium and vitamin D in postmenopausal women with osteoporosis, 1500 mg of calcium daily (total diet plus supplement) in divided doses and 7889-4691 international units of vitamin D daily are advised.        Foods and drinks with calcium     Food Calcium, milligrams   Milk (skim, 2 percent, or whole, 8 oz [240 mL]) 300   Yogurt (6 oz [168 g]) 250   Orange juice (with calcium, 8 oz [240 mL]) 300   Tofu with calcium (1/2 cup [113 g]) 435   Cheese (1 oz [28 g]) 195 to 335 (hard cheese = higher calcium)   Cottage cheese (1/2 cup [113 g]) 130   Ice cream or frozen yogurt (1/2 cup [113 g]) 100   Soy milk (8 oz [240 mL]) 300   Beans (1/2 cup cooked [113 g]) 60 to 80   Dark, leafy green vegetables (1/2 cup cooked [113 g]) 50 to 135   Almonds (24 whole) 70   Orange (1 medium) 60        Selected food sources of vitamin D   Food International units per serving   Cod liver oil, 1 tablespoon (15 mL) 1360   [place] (sockeye), cooked, 3 ounces (85 g) 794   Mushrooms that have been exposed to ultraviolet light to increase vitamin D, 3 ounces (85 g) (not yet commonly available) 400   Mackerel, cooked, 3 ounces (85 g) 388   Tuna fish, canned in water, drained, 3 ounces (85 g) 154   Milk, nonfat, reduced fat, and whole, vitamin D-fortified, 8 ounces (240 mL) 115 to 124   Orange juice fortified with vitamin D, 8 ounces (240 mL) (check product labels, as amount of added vitamin D varies) 100   Yogurt, fortified with 20 percent of the DV for vitamin D, 6 ounces (180 mL) (more heavily fortified yogurts provide more of the DV) 80   Margarine, fortified, 1 tablespoon (15 g) 60   Sardines, canned in oil, drained, 2 sardines 46   Liver, beef, cooked, 3.5 ounces (100 g) 46   Ready-to-eat cereal, fortified with 10 percent of the DV for vitamin D, 6 to 8 ounces (227 g) (more heavily fortified cereals might provide more of the  DV) 40   Egg, 1 whole (vitamin D is found in yolk) 25   Cheese, Swiss, 1 ounce (29 g) 6     Elemental calcium and Vitamin D content per pill of different calcium supplements    Elemental Ca/tablet Ca compound Vitamin D   Caltrate 600 + D3 600 mg Carbonate 800 units   Caltrate 600 + D3 Soft Chews  600 mg  Carbonate  800 units    Caltrate Gummy Bites 250 mg  Tribasic calcium phosphate 400 units   Caltrate 600 + D3 Plus Minerals Chewables  600 mg Carbonate 800 units   Caltrate 600 + D3 Plus Minerals Minis 300 mg Carbonate 800 units   Citracal Petites  200 mg  Citrate 250 units   Citracal Maximum  315 mg Citrate 250 units   Citracal Plus Magnesium & Minerals 250 mg  Citrate 125 units   Citracal + D Slow Release  600 mg Citrate 500 units   Citracal Calcium Gummies 250 mg Tricalcium phosphate 500 units   Citracal Calcium Pearls  200 mg  Carbonate 500 units   OsCal Calcium + D3 500 mg Carbonate 200 units   Oscal Extra + D3 500 mg Carbonate 600 units   Oscal Ultra 600 mg Carbonate 500 units   Oscal Chewable  500  Carbonate 600 units   Tums 200 mg Carbonate -   Tums Extra Strength 300 mg Carbonate -   Tums Ultra Strength 400 mg Carbonate -   Tums Chewy Delights  400 mg  Carbonate -   Viactiv Calcium plus D + K 500 mg Carbonate 500 units  (or 1000 units in sugar-free)   Units: international units.         website  nof.org

## 2021-06-05 NOTE — PROGRESS NOTES
HISTORY/PHYSICAL EXAM  Skylar Spaulding   64 y.o. female  Is here for a physical healthcare maintenance examination        Assessment/Plan for  Skylar Spaulding is a 64 y.o. female.       1.  Healthcare maintenance examination-healthy woman healthy lifestyle  2.  Distant history of breast cancer-ER NE positive.  Followed by oncology annually  3.  Menopausal-followed by GYN  4.  Hyperlipidemia with normal coronary calcium score on Crestor.  5.  Family history of coronary disease     Plan:  1.  Continue current Rx    2.  CT colonography.  Recommended by colorectal.  She had a very tortuous colon with multiple diverticulosis and Dr. Alma Ellis suggested she not undergo colonoscopy in the future.  3.  Routine labs including urinalysis  4.  Routine vitamin D calcium supplement discussed        Patient Instructions       Combined calcium and vitamin D supplementation appears to reduce fracture risk in older adults.  ?The optimal intake of calcium and vitamin D in postmenopausal women with osteoporosis, 1500 mg of calcium daily (total diet plus supplement) in divided doses and 6315-7192 international units of vitamin D daily are advised.        Foods and drinks with calcium     Food Calcium, milligrams   Milk (skim, 2 percent, or whole, 8 oz [240 mL]) 300   Yogurt (6 oz [168 g]) 250   Orange juice (with calcium, 8 oz [240 mL]) 300   Tofu with calcium (1/2 cup [113 g]) 435   Cheese (1 oz [28 g]) 195 to 335 (hard cheese = higher calcium)   Cottage cheese (1/2 cup [113 g]) 130   Ice cream or frozen yogurt (1/2 cup [113 g]) 100   Soy milk (8 oz [240 mL]) 300   Beans (1/2 cup cooked [113 g]) 60 to 80   Dark, leafy green vegetables (1/2 cup cooked [113 g]) 50 to 135   Almonds (24 whole) 70   Orange (1 medium) 60        Selected food sources of vitamin D   Food International units per serving   Cod liver oil, 1 tablespoon (15 mL) 1360   S [place] (sockeye), cooked, 3 ounces (85 g) 794   Mushrooms that have been exposed to  ultraviolet light to increase vitamin D, 3 ounces (85 g) (not yet commonly available) 400   Mackerel, cooked, 3 ounces (85 g) 388   Tuna fish, canned in water, drained, 3 ounces (85 g) 154   Milk, nonfat, reduced fat, and whole, vitamin D-fortified, 8 ounces (240 mL) 115 to 124   Orange juice fortified with vitamin D, 8 ounces (240 mL) (check product labels, as amount of added vitamin D varies) 100   Yogurt, fortified with 20 percent of the DV for vitamin D, 6 ounces (180 mL) (more heavily fortified yogurts provide more of the DV) 80   Margarine, fortified, 1 tablespoon (15 g) 60   Sardines, canned in oil, drained, 2 sardines 46   Liver, beef, cooked, 3.5 ounces (100 g) 46   Ready-to-eat cereal, fortified with 10 percent of the DV for vitamin D, 6 to 8 ounces (227 g) (more heavily fortified cereals might provide more of the DV) 40   Egg, 1 whole (vitamin D is found in yolk) 25   Cheese, Swiss, 1 ounce (29 g) 6     Elemental calcium and Vitamin D content per pill of different calcium supplements    Elemental Ca/tablet Ca compound Vitamin D   Caltrate 600 + D3 600 mg Carbonate 800 units   Caltrate 600 + D3 Soft Chews  600 mg  Carbonate  800 units    Caltrate Gummy Bites 250 mg  Tribasic calcium phosphate 400 units   Caltrate 600 + D3 Plus Minerals Chewables  600 mg Carbonate 800 units   Caltrate 600 + D3 Plus Minerals Minis 300 mg Carbonate 800 units   Citracal Petites  200 mg  Citrate 250 units   Citracal Maximum  315 mg Citrate 250 units   Citracal Plus Magnesium & Minerals 250 mg  Citrate 125 units   Citracal + D Slow Release  600 mg Citrate 500 units   Citracal Calcium Gummies 250 mg Tricalcium phosphate 500 units   Citracal Calcium Pearls  200 mg  Carbonate 500 units   OsCal Calcium + D3 500 mg Carbonate 200 units   Oscal Extra + D3 500 mg Carbonate 600 units   Oscal Ultra 600 mg Carbonate 500 units   Oscal Chewable  500  Carbonate 600 units   Tums 200 mg Carbonate -   Tums Extra Strength 300 mg Carbonate -    Tums Ultra Strength 400 mg Carbonate -   Tums Chewy Delights  400 mg  Carbonate -   Viactiv Calcium plus D + K 500 mg Carbonate 500 units  (or 1000 units in sugar-free)   Units: international units.         website  [email or web address]            Diagnoses and all orders for this visit:    Annual physical exam  -     CT Colonoscopy Diagnostic With Contrast; Future; Expected date: 01/08/2020  -     [facility](CBC and Differential)  -     Urinalysis-UC if Indicated  -     Basic Metabolic Panel  -     Hepatic Profile  -     Lipid Cascade  -     Thyroid Cascade  -     Vitamin D, Total (25-Hydroxy)  -     Electrocardiogram Perform - Clinic  -     1 (CBC with Diff)    Hypothyroidism, unspecified type            Medications:  Medications after visit  Current Outpatient Medications   Medication Sig Dispense Refill     aspirin 81 MG EC tablet Take 81 mg by mouth daily.       calcium carbonate-vitamin D2 500 mg(1,250mg) -200 unit tablet Take 1 tablet by mouth 3 (three) times a day.       cholecalciferol, vitamin D3, (VITAMIN D3) 2,000 unit cap Take 1,000 Units by mouth daily.        levothyroxine (SYNTHROID, LEVOTHROID) 50 MCG tablet TAKE 1 TABLET BY MOUTH DAILY 90 tablet 3     multivitamin therapeutic (THERAGRAN) tablet Take 1 tablet by mouth daily.       rosuvastatin (CRESTOR) 5 MG tablet Take 5 mg by mouth at bedtime.       No current facility-administered medications for this visit.               Royal Faith MD  Internal medicine  [facility]  [phone]  [email or web address]      This is an electronically verified report by Royal Faith M.D.  (Note created with Dragon voice recognition and unintended spelling errors and word substitutions may occur)        SUBJECTIVE/HPI    Chief Complaint:  Annual Exam (Fasting)  In for annual exam    She is doing well  She has had a healthy year    She does new grandmother  -Granddaughter Zuleyma born April 4/2019 to her daughter and  who lives in  [place]      She is on Crestor.  There is a family history of coronary disease.  Coronary calcium score 10/2018 was 0.  She is tolerating the Crestor without myalgia arthralgia        Review of Systems:   Extensive 14-point comprehensive review of systems was performed.   Patient reports  1.  She does see GYN, oncology.  She has had mammogram.  She is due for colorectal screening.  Questions regarding colonography and Cologuard    Otherwise, the following systems are negative including constitutional, eyes, ears, nose and throat, cardiovascular, respiratory, gastrointestinal, genitourinary, musculoskeletal,neurological, skin and/or breast, endocrine, hematologic/lymph, allergic/immunologic and psychiatric.  Surgical History  Past Surgical History:   Procedure Laterality Date     BREAST BIOPSY      Left     BREAST LUMPECTOMY      left     DENTAL SURGERY       ENDOMETRIAL BIOPSY  02/2018     POLYPECTOMY      uterine polyp        Medical History     Past Medical History:   Diagnosis Date     Benign positional vertigo      Breast cancer (H)      ER- MT+ carcinoma of breast (H)      Hyperlipidemia      Menopause      Microscopic hematuria      Osteoarthritis      Patient Active Problem List    Diagnosis Date Noted     Hyperlipidemia 11/03/2015     Postmenopausal 11/03/2015     Breast cancer (H) 11/03/2015        Family History  Family History   Problem Relation Age of Onset     Heart attack Father      Diabetes Mother      Osteoarthritis Mother      Cancer Sister         breast     Hyperlipidemia Sister      Hypothyroidism Sister      Hyperlipidemia Brother              Medications:  Current Outpatient Medications on File Prior to Visit   Medication Sig     aspirin 81 MG EC tablet Take 81 mg by mouth daily.     calcium carbonate-vitamin D2 500 mg(1,250mg) -200 unit tablet Take 1 tablet by mouth 3 (three) times a day.     cholecalciferol, vitamin D3, (VITAMIN D3) 2,000 unit cap Take 1,000 Units by mouth daily.       levothyroxine (SYNTHROID, LEVOTHROID) 50 MCG tablet TAKE 1 TABLET BY MOUTH DAILY     multivitamin therapeutic (THERAGRAN) tablet Take 1 tablet by mouth daily.     rosuvastatin (CRESTOR) 5 MG tablet Take 5 mg by mouth at bedtime.     [DISCONTINUED] rosuvastatin (CRESTOR) 10 MG tablet TAKE 0.5 TABLETS BY MOUTH EVERY DAY     No current facility-administered medications on file prior to visit.           Allergies:  Allergies   Allergen Reactions     Lipitor [Atorvastatin] Myalgia     Statins-Hmg-Coa Reductase Inhibitors Myalgia       PSFHx:   Social History     Socioeconomic History     Marital status:      Spouse name: Not on file     Number of children: Not on file     Years of education: Not on file     Highest education level: Not on file   Occupational History     Not on file   Social Needs     Financial resource strain: Not on file     Food insecurity:     Worry: Not on file     Inability: Not on file     Transportation needs:     Medical: Not on file     Non-medical: Not on file   Tobacco Use     Smoking status: Never Smoker     Smokeless tobacco: Never Used   Substance and Sexual Activity     Alcohol use: No     Drug use: No     Sexual activity: Not on file   Lifestyle     Physical activity:     Days per week: Not on file     Minutes per session: Not on file     Stress: Not on file   Relationships     Social connections:     Talks on phone: Not on file     Gets together: Not on file     Attends [employer] service: Not on file     Active member of club or organization: Not on file     Attends meetings of clubs or organizations: Not on file     Relationship status: Not on file     Intimate partner violence:     Fear of current or ex partner: Not on file     Emotionally abused: Not on file     Physically abused: Not on file     Forced sexual activity: Not on file   Other Topics Concern     Not on file   Social History Narrative    Adin-diabetes    Kena Ogden    Native of [facility]     "[facility]                       Objective:   /82   Pulse 66   Ht 5' 4.5\" (1.638 m)   Wt 130 lb 0.6 oz (59 kg)   LMP  (LMP Unknown)   SpO2 98%   Breastfeeding No   BMI 21.98 kg/m    Weight:   Wt Readings from Last 3 Encounters:   01/08/20 130 lb 0.6 oz (59 kg)   06/16/19 138 lb (62.6 kg)   06/14/19 136 lb (61.7 kg)       General-appears well, no acute distress.  Healthy-appearing woman    Skin: Normal. No rash or lesion.  Multiple keratoses  Lymph Nodes: None palpable-including neck, axilla, inguinal, epitrochlear.  Head:  Normocephalic.    Eyes: Midline.  Equal size., full ROM.  External exams normal.  No icterus  Ears:  Normal pinnae, canals, and TM's.    Nose:  Patent, without deformity.    Throat:  Moist mucous membranes without lesions, erythema, or exudate.    Neck: No palpable masses, lymphadenopathy or tenderness.No thyromegaly or goiter.  No thyroid nodule.  Carotid Arteries:  No Bruit.  Carotid upstroke normal  Chest Wall: No deformity or pain elicited on compression.  Respiratory:  Normal respiratory effort.  Lungs are clear with good breath sounds.  No dullness.  No wheezing.  Heart: Regular rhythm.  Normal sounding S1, S2 without S3, S4, murmurs, rubs, or gallops.    Abdomen:  The abdomen was flat, soft and nontender without guarding rebound or masses.  There are normal bowel sounds.  There is no hepatosplenomegaly.  There is no palpable enlargement of the aorta.  GYN/breast-not examined  Extremities:  Full ROM without limitation, deformity or edema.    4+ pulses  Neurologic-intact- No focal deficit.  Speech clear.  Coordination normal.  Strength symmetric  Orthopedic-no arthropathy.          Laboratory:    Recent Results (from the past 24 hour(s))   Electrocardiogram Perform - Clinic   Result Value Ref Range    SYSTOLIC BLOOD PRESSURE      DIASTOLIC BLOOD PRESSURE      VENTRICULAR RATE 61 BPM    ATRIAL RATE 61 BPM    P-R INTERVAL 148 ms    QRS DURATION 74 ms    Q-T INTERVAL 422 ms "    QTC CALCULATION (BEZET) 424 ms    P Axis 76 degrees    R AXIS 79 degrees    T AXIS 44 degrees    MUSE DIAGNOSIS       Normal sinus rhythm  Normal ECG  When compared with ECG of 30-NOV-2018 10:43,  No significant change was found     HM1 (CBC with Diff)   Result Value Ref Range    WBC 6.5 4.0 - 11.0 thou/uL    RBC 4.54 3.80 - 5.40 mill/uL    Hemoglobin 13.8 12.0 - 16.0 g/dL    Hematocrit 40.7 35.0 - 47.0 %    MCV 90 80 - 100 fL    MCH 30.5 27.0 - 34.0 pg    MCHC 34.0 32.0 - 36.0 g/dL    RDW 12.7 11.0 - 14.5 %    Platelets 254 140 - 440 thou/uL    MPV 7.6 7.0 - 10.0 fL    Neutrophils % 56 50 - 70 %    Lymphocytes % 34 20 - 40 %    Monocytes % 7 2 - 10 %    Eosinophils % 2 0 - 6 %    Basophils % 1 0 - 2 %    Neutrophils Absolute 3.7 2.0 - 7.7 thou/uL    Lymphocytes Absolute 2.2 0.8 - 4.4 thou/uL    Monocytes Absolute 0.5 0.0 - 0.9 thou/uL    Eosinophils Absolute 0.1 0.0 - 0.4 thou/uL    Basophils Absolute 0.0 0.0 - 0.2 thou/uL

## 2021-06-07 NOTE — TELEPHONE ENCOUNTER
Patient had question about a rash she noticed after working in the yardd yesterday,  She describes small pimple  (3-4) on her chest under her bra line and a large area on her back.  The Back is not raised or have pustules.    She was concerned about shingles even though she has had her vaccination.  At this time I advised her to shower to rinse an residue off, apply cold compresses to relieve itchiness and may use over the counter hyrocortisone cream  As directed.  If symptoms do not resolve or worsen patient will call back to make appt as needed.  She denies fever or other symptoms at this time.  Patient verbalized understanding and agrees with this plan.    Laurie Rodarte RN

## 2021-06-07 NOTE — PROGRESS NOTES
Vasovagal syncope at time of colonoscopy      Addendum  EXAM DATE:         02/11/2020     Addendum to CT colonoscopy done 02/11/2020.     Patient would like what follows to be noted in her medical record.     After the rectal balloon was placed and her colon was insufflated with air for  her supine images, she became quite uncomfortable and had abdominal pain. She  wished the study be halted so she could go to the bathroom.     When the technologist went to check on her, he found her in the bathroom  collapsed on the floor in front of the toilet along the wall and covered in  perspiration. Attending staff was immediately contacted to attend to her.     She was found to be quite pale, dizzy and diaphoretic and appeared to have had a  vasovagal response without any other sites of injury.     She was treated symptomatically and given IV saline due to the degree of  dehydration (given the colon prep) and observed for 40 minutes.     She recovered from her episode and felt comfortable to proceed with the exam  which she subsequently completed without incidence.     The addendum ends here.

## 2021-06-08 NOTE — TELEPHONE ENCOUNTER
RN cannot approve Refill Request    RN can NOT refill this medication historical medication requested.     Abigail Kerr, [facility] Triage/Med Refill 5/7/2020    Requested Prescriptions   Pending Prescriptions Disp Refills     rosuvastatin (CRESTOR) 5 MG tablet [Pharmacy Med Name: ROSUVASTATIN 5MG TABLETS] 90 tablet 0     Sig: TAKE 1 TABLET BY MOUTH EVERY DAY       Statins Refill Protocol (Hmg CoA Reductase Inhibitors) Passed - 5/5/2020  1:48 PM        Passed - PCP or prescribing provider visit in past 12 months      Last office visit with prescriber/PCP: Visit date not found OR same dept: Visit date not found OR same specialty: 6/14/2019 Clover Guo, CNP  Last physical: Visit date not found Last MTM visit: Visit date not found   Next visit within 3 mo: Visit date not found  Next physical within 3 mo: Visit date not found  Prescriber OR PCP: Wai Castañeda MD  Last diagnosis associated with med order: There are no diagnoses linked to this encounter.  If protocol passes may refill for 12 months if within 3 months of last provider visit (or a total of 15 months).

## 2021-06-09 NOTE — PROGRESS NOTES
OFFICE VISIT NOTE  Skylar Spaulding   61 y.o. female      Subjective:   Chief Complaint:  Neck Pain (more of an ache- pressure - uncomfortable to swallow - at night feels pressure in ear)    No issue  Increasing right-sided neck discomfort in the right jaw and base of the right neck over 2 months  Achy  No arm symptoms  Some relief with when necessary ibuprofen.  No history of trauma  Onset started after a fairly vigorous neck exam from her oncologist.  Patient has history of localized breast cancer.  On anti-estrogen therapy.  No history of any bone metastasis.    Patient has history of hypothyroidism.  On thyroid replacement.    Review of Systems:     Extensive 10-point review of systems was performed. Please see the HPI for problem specific pertinent review of systems.     Patient does note all is well.  No infectious symptoms     Otherwise, the following systems are noncontributory including constitutional, eyes, ears, nose and throat, cardiovascular, respiratory, gastrointestinal, genitourinary, musculoskeletal,neurological, skin and/or breast, endocrine, hematologic/lymph, allergic/immunologic and psychiatric.              Medications:  Current Outpatient Prescriptions   Medication Sig Dispense Refill     aspirin 81 MG EC tablet Take 81 mg by mouth daily.       calcium carbonate-vitamin D2 500 mg(1,250mg) -200 unit tablet Take 1 tablet by mouth 3 (three) times a day.       cholecalciferol, vitamin D3, (VITAMIN D3) 2,000 unit cap Take 1,000 Units by mouth daily.        levothyroxine (SYNTHROID, LEVOTHROID) 50 MCG tablet Take 1 tablet (50 mcg total) by mouth Daily at 6:00 am. 100 tablet 3     multivitamin therapeutic (THERAGRAN) tablet Take 1 tablet by mouth daily.       raloxifene (EVISTA) 60 mg tablet Take 1 tablet (60 mg total) by mouth daily. 30 tablet 0     rosuvastatin (CRESTOR) 10 MG tablet Take 0.5 tablets (5 mg total) by mouth daily. 100 tablet 3     celecoxib (CELEBREX) 200 MG capsule Take 1 capsule  "(200 mg total) by mouth daily. 30 capsule 2     meclizine (ANTIVERT) 25 mg tablet Take 1 tablet (25 mg total) by mouth 3 (three) times a day as needed for dizziness or nausea. 30 tablet 1     No current facility-administered medications for this visit.      Current Outpatient Prescriptions on File Prior to Visit   Medication Sig     aspirin 81 MG EC tablet Take 81 mg by mouth daily.     calcium carbonate-vitamin D2 500 mg(1,250mg) -200 unit tablet Take 1 tablet by mouth 3 (three) times a day.     cholecalciferol, vitamin D3, (VITAMIN D3) 2,000 unit cap Take 1,000 Units by mouth daily.      levothyroxine (SYNTHROID, LEVOTHROID) 50 MCG tablet Take 1 tablet (50 mcg total) by mouth Daily at 6:00 am.     multivitamin therapeutic (THERAGRAN) tablet Take 1 tablet by mouth daily.     rosuvastatin (CRESTOR) 10 MG tablet Take 0.5 tablets (5 mg total) by mouth daily.     [DISCONTINUED] raloxifene (EVISTA) 60 mg tablet Take 60 mg by mouth daily.      meclizine (ANTIVERT) 25 mg tablet Take 1 tablet (25 mg total) by mouth 3 (three) times a day as needed for dizziness or nausea.     No current facility-administered medications on file prior to visit.        Allergies:  Allergies   Allergen Reactions     Lipitor [Atorvastatin] Myalgia     Statins-Hmg-Coa Reductase Inhibitors Myalgia       PSFHx: Tobacco Status:  She  reports that she has never smoked. She has never used smokeless tobacco.   Alcohol Status:    History   Alcohol Use No       reports that she has never smoked. She has never used smokeless tobacco. She reports that she does not drink alcohol or use illicit drugs.    Objective:      Visit Vitals     /72 (Patient Site: Right Arm, Patient Position: Sitting)     Pulse 72     Temp 97.7  F (36.5  C)     Ht 5' 4.5\" (1.638 m)     Wt 138 lb (62.6 kg)     SpO2 99%     BMI 23.32 kg/m2     Weight:   Wt Readings from Last 3 Encounters:   02/23/17 138 lb (62.6 kg)   11/28/16 137 lb 1.9 oz (62.2 kg)   11/03/15 141 lb (64 kg) "     BP Readings from Last 3 Encounters:   02/23/17 118/72   11/28/16 112/74   11/03/15 106/72         General-appears well, no acute distress.  Healthy-appearing woman  ENT exam normal  Neck range of motion normal  No localized tenderness  No neck adenopathy  No thyromegaly  Strength normal in hands  Normal arm pulses      Review of clinical lab tests  Lab Results   Component Value Date    WBC 4.4 11/21/2016    HGB 13.5 11/21/2016    HCT 41.1 11/21/2016     11/21/2016    CHOL 196 11/21/2016    TRIG 74 11/21/2016    HDL 73 11/21/2016    ALT 25 11/21/2016    AST 22 11/21/2016     11/21/2016    K 3.9 11/21/2016     11/21/2016    CREATININE 0.94 11/21/2016    BUN 13 11/21/2016    CO2 29 11/21/2016    TSH 1.81 11/21/2016    HGBA1C 5.9 11/28/2016                Assessment/Plan for  Skylar Spaulding is a 61 y.o. female.  No Patient Care Coordination Note on file.       1. Neck pain  We'll treat with Celebrex 200 mg daily for 1 month.  Patient leaving for [place] in the a.m.  If unresolved at that time or certainly worsening would get CT soft tissue of neck.  - celecoxib (CELEBREX) 200 MG capsule; Take 1 capsule (200 mg total) by mouth daily.  Dispense: 30 capsule; Refill: 2  - raloxifene (EVISTA) 60 mg tablet; Take 1 tablet (60 mg total) by mouth daily.  Dispense: 30 tablet; Refill: 0     2.  History of breast cancer.  3.  Hypothyroidism  Plan:  Celebrex  Clinic follow-up on return from [place]    Diagnoses and all orders for this visit:    Neck pain  -     celecoxib (CELEBREX) 200 MG capsule; Take 1 capsule (200 mg total) by mouth daily.  Dispense: 30 capsule; Refill: 2  -     raloxifene (EVISTA) 60 mg tablet; Take 1 tablet (60 mg total) by mouth daily.  Dispense: 30 tablet; Refill: 0                Royal Faith MD  Internal medicine  [facility]  [phone]  [email or web address]      This is an electronically verified report by Royal Faith M.D.  (Note created  with Dragon voice recognition and unintended spelling errors and word substitutions may occur)

## 2021-06-12 NOTE — PROGRESS NOTES
I met with Skylar Spaulding at the request of patient to recheck her blood pressure.  Blood pressure medications on the MAR were reviewed with patient.    Patient has taken all medications as per usual regimen: Yes  Patient reports tolerating them without any issues or concerns: Yes    Vitals:    10/08/20 1330   BP: 127/76   Pulse: 89       Blood pressure was taken, previous encounter was reviewed, recorded blood pressure below 140/90.  Patient was discharged and the note will be sent to the provider for final review. patient was instructed to follow up as needed .

## 2021-06-12 NOTE — PROGRESS NOTES
"Skylar Spaulding is a 64 y.o. female who is being evaluated via a billable video visit.      The patient has been notified of following:     \"This video visit will be conducted via a call between you and your physician/provider. We have found that certain health care needs can be provided without the need for an in-person physical exam.  This service lets us provide the care you need with a video conversation.  If a prescription is necessary we can send it directly to your pharmacy.  If lab work is needed we can place an order for that and you can then stop by our lab to have the test done at a later time.    Video visits are billed at different rates depending on your insurance coverage. Please reach out to your insurance provider with any questions.    If during the course of the call the physician/provider feels a video visit is not appropriate, you will not be charged for this service.\"    Patient has given verbal consent to a Video visit? Yes  How would you like to obtain your AVS? AVS Preference: MyChart.  If dropped by the video visit, the video invitation should be sent to: Send to e-mail at: [email or web address]  Will anyone else be joining your video visit? No    Very pleasant patient of Dr Faith here to establish care .She is doing really well with chronic Hip pain and  shoulder pain. But still able to exercise .   Has a great family , two children , two daughters , first time grandmother , the other daughter lives in [place] . When she was 49 she developed breast cancer .s/p  lumpectomy , radiation .DId get tamoxifen for a short while.  Is very compliant with tests and her health / Used to see Dr humphrey . Used to see oncologists every year , gets mammogram at [facility] breast [facility]   Has had high cholesterol , there was some issue with Lipitor .and is taking low dose crestor and tolerating it . Has a family history of IHD   Last    Lab Results   Component Value Date    HGBA1C 6.2 (H) 01/08/2020 "     Mother  of colon cancer at age 95   Was on  Tamoxifen for a whole year then was put on evista for many years , a year ago    Dr adia Alfred tried to do the colonoscopy and it was bit tortiuso   Colonography    father  of a massive hert attack . Has three other siblings  All of them are on cholesteol meds /   The air was of colonography was intense , pt passed out , hepatic cysts   Niece had thyroid cancer   Has had vaginal drainage and has had endometrial biopsy   dexa scan   Video Start Time: 950 am           Video-Visit Details    Type of service:  Video Visit    Video End Time (time video stopped): 1045 am   Originating Location (pt. Location): Home    Distant Location (provider location):  [facility]   Assess/plan  1. Healthcare maintenance  mammo annual   Colon has a tortuous colon and was told not to get it done again. Then had colon enterography and then virtual colonoscopy    dexa    pap done by gyn  We spent time   2. Osteopenia, unspecified location  Due to being off raloxifene needs a dexa   - DXA Bone Density Scan; Future    3. ASCVD (arteriosclerotic cardiovascular disease)  Has high LDL and would find this helpful for better risk assessment . Will review at her next visit the need for increasing crestor   - CT Cardiac Calcium Score; Future    Platform used for Video Visit: Annelise Wray MD

## 2021-06-14 NOTE — PROGRESS NOTES
"Had a lengthy discussion about this.  I will have traced back when she started her statin first.  Was over  Office Visit - Physical   Skylar Spaulding   65 y.o.  female    Date of visit: 2/2/2021  Physician: Palmira Wray MD     Assessment and Plan   1. Hyperlipidemia, unspecified hyperlipidemia type  She has been on a statin for 2 decades  Most likely it was due to her very high LDL and family history risk.  However over time she has had an elevation in her A1c.  And she is now in the prediabetes/\"borderline diabetes level.  We did talk about this in detail and how statins can increase the risk of diabetes in some patients.  There is no other explanation because her diet and lifestyle is excellent and BMI is excellent.  He may consider statin break and see what her LDL is off the statin and her risk score and BNP and what happens to her A1c in 6 months.  Then we could also do her high risk cardiac CRP at home, homocystine level lipoprotein a test to see if she has any other   reason of her evidence of active atherosclerosis.  Of note the low-dose CT scan coronary calcium score was 0 but that could be because she is already being on a statin.- rosuvastatin (CRESTOR) 5 MG tablet; Take 1 tablet (5 mg total) by mouth daily.  Dispense: 90 tablet; Refill: 3    2. Healthcare maintenance  mammo annual   Colon has a tortuous colon and was told not to get it done again. Then had colon enterography and then virtual colonoscopy 2020   dexa 2018   pap done by gyn       3. Malignant neoplasm of left breast in female, estrogen receptor negative, unspecified site of breast (H)  She is in remission she was on Evista that was stopped due to her endometrial problems.  She has had multiple endometrial biopsies and is being followed closely by gynecology.  She no longer has any spotting.  Of note the DEXA scan could be artificially better because of the Evista.  But she was on and then it declined off the Evista.  However she " still does not have osteoporosis and does not need to be given a prescription medicines    4. Routine history and physical examination of adult  We discussed her health maintenance and how it is completely up-to-date she is due for Pneumo 23 at age 66    5. Hypothyroidism, unspecified type  She has well-controlled hypothyroidism            Time:   No follow-ups on file.       Patient Profile   Social History     Social History Narrative    [place]in[place] [facility] [place]        Lives with  , family in town ,         Past Medical History   Past Medical History:   Diagnosis Date     Benign positional vertigo      Breast cancer (H)      ER- GA+ carcinoma of breast (H)      Hyperlipidemia      Menopause      Microscopic hematuria      Osteoarthritis        Patient Active Problem List    Diagnosis Date Noted     Healthcare maintenance 10/07/2020     Overview Note:     mammo annual   Colon has a tortuous colon and was told not to get it done again. Then had colon enterography and then virtual colonoscopy 2020   dexa 2018   pap done by gyn       Hyperlipidemia 11/03/2015     Postmenopausal 11/03/2015     Breast cancer (H) 11/03/2015     Overview Note:     2003               Past Surgical History  She has a past surgical history that includes Breast biopsy; Breast lumpectomy; Dental surgery; Polypectomy; and Endometrial biopsy (02/2018).     History of Present Illness   This 65 y.o. old very pleasant patient here for her annual physical.  She is in her routine normal state of health.  She has an excellent lifestyle and eats a healthy diet exercises regularly is a non-smoker.  She has history of early heart disease in her father.  The and has been on a statin for many years.  Which she has been tolerating quite well.  But it is a very low dose.  She has multiple questions on the statin that and the DEXA scan her screening test so far.    Review of Systems: A comprehensive  "review of systems was negative except as noted.     Medications and Allergies   Current Outpatient Medications   Medication Sig Dispense Refill     aspirin 81 MG EC tablet Take 81 mg by mouth daily.       calcium carbonate-vitamin D2 500 mg(1,250mg) -200 unit tablet Take 1 tablet by mouth 3 (three) times a day.       cholecalciferol, vitamin D3, (VITAMIN D3) 2,000 unit cap Take 1,000 Units by mouth daily.        levothyroxine (SYNTHROID, LEVOTHROID) 50 MCG tablet Take 1 tablet (50 mcg total) by mouth daily. 90 tablet 3     multivitamin therapeutic (THERAGRAN) tablet Take 1 tablet by mouth daily.       rosuvastatin (CRESTOR) 5 MG tablet TAKE 1 TABLET BY MOUTH EVERY DAY 90 tablet 3     Saccharomyces boulardii (FLORASTOR) 250 mg capsule Take 250 mg by mouth daily.       No current facility-administered medications for this visit.      Allergies   Allergen Reactions     Lidocaine      Lipitor [Atorvastatin] Myalgia     Statins-Hmg-Coa Reductase Inhibitors Myalgia        Family and Social History   Family History   Problem Relation Age of Onset     Heart attack Father      Diabetes Mother      Osteoarthritis Mother      Cancer Sister         breast     Hyperlipidemia Sister      Hypothyroidism Sister      Hyperlipidemia Brother         Social History     Tobacco Use     Smoking status: Never Smoker     Smokeless tobacco: Never Used   Substance Use Topics     Alcohol use: No     Drug use: No          Physical Exam   General Appearance:       /78 (Patient Site: Right Arm, Patient Position: Sitting, Cuff Size: Adult Regular)   Pulse 76   Ht 5' 5.5\" (1.664 m)   Wt 133 lb 8 oz (60.6 kg)   LMP  (LMP Unknown)   SpO2 99%   BMI 21.88 kg/m        HEAD, EARS, NOSE, MOUTH, AND THROAT: Head and face were normal. Hearing was normal to voice and the ears were normal to external exam.NECK: Neck appearance was normal. There were no neck masses and the thyroid was not enlarged.  RESPIRATORY: Breathing pattern was normal and " the chest moved symmetrically.  Percussion/auscultatory percussion was normal.  Lung sounds were normal and there were no abnormal sounds.  CARDIOVASCULAR: Heart rate and rhythm were normal.  S1 and S2 were normal and there were no extra sounds or murmurs. Peripheral pulses in arms and legs were normal.  Jugular venous pressure was normal.  There was no peripheral edema.  GASTROINTESTINAL: The abdomen was normal in contour.  Bowel sounds were present.  Percussion detected no organ enlargement or tenderness.  Palpation detected no tenderness, mass, or enlarged organs.   MUSCULOSKELETAL: Skeletal configuration was normal and muscle mass was normal for age. Joint appearance was overall normal.  LYMPHATIC: There were no enlarged nodes.  SKIN/HAIR/NAILS: Skin color was normal.  There were no skin lesions.  Hair and nails were normal.  NEUROLOGIC: The patient was alert and oriented to person, place, time, and circumstance. Speech was normal. Cranial nerves were normal. Motor strength was normal for age. The patient was normally coordinated.  PSYCHIATRIC:  Mood and affect were normal and the patient had normal recent and remote memory. The patient's judgment and insight were normal.    BREASTS: Left breast has a scar and is slightly asymmetrical in appearance because of back lumpectomy but there is no suspicious lumps axilla is free of lymph nodes           Additional Information        Palmira Wray MD  Internal Medicine  Contact me at [phone]

## 2021-06-15 NOTE — PROGRESS NOTES
Office Visit - Follow Up   Skylar Spaulding   62 y.o. female    Date of Visit: 1/30/2018    Chief Complaint   Patient presents with     Wheezing     saw OB this AM and was told to come in and get chest xray. Complains of SOB with deep breath and rattling in the lungs.         Assessment and Plan   1. URI (upper respiratory infection)  Was seen at the GYN office for checkup this morning.  Reported to her gynecologist of chest fluttering last night  and slight cough.  Not short of breath.  Did not have chest pains.  No fever.  When the gynecologist listened to her apparently she heard rattling  sounds on the left side of her chest.  Was advised to see her primary MD.  Exam of her chest and lungs  is essentially normal.  Informed her her initial symptoms could be starting viral URI.  Reassured her this is self-limiting and nothing to be done at this point.  Informed her she does not need a chest x-ray because of her normal exams of her chest, lungs and heart.    Follow up as needed with her primary MD, Dr. Faith     History of Present Illness   This 62 y.o. old female patient of Dr. Faith who was sent by her gynecologist  for evaluation.  Apparently she complained to her gynecologist, she had chest fluttering and slight cough last night.  Did not have fever.  Did not have chest pains and shortness of breath.  Feels otherwise well.  But when the gynecologist listened to her chest, she noted some rattling sound in the left chest.  For these she was advised to see her primary MD.  In the absence of Dr. Faith I saw her for  these.  Healthy otherwise.  Takes levothyroxine for hypothyroidism and rosuvastatin for hyperlipidemia.  Had history of left breast cancer status post breast lumpectomy in 2003 without recurrence.  Doing well overall.    Review of Systems   A 12 point comprehensive review of systems was negative except as noted..     Medications, Allergies and Problem List   Reviewed and updated             Chief  "Complaint   Wheezing (saw OB this AM and was told to come in and get chest xray. Complains of SOB with deep breath and rattling in the lungs. )       Patient Profile   Social History     Social History Narrative    Adin-diabetes    Kena [place]    Native of [facility]                Past Medical History   Patient Active Problem List   Diagnosis     Hyperlipidemia     Postmenopausal     Breast cancer       Past Surgical History  She has a past surgical history that includes Breast biopsy; Breast lumpectomy; Dental surgery; and Polypectomy.       Medications and Allergies   Current Outpatient Prescriptions   Medication Sig     aspirin 81 MG EC tablet Take 81 mg by mouth daily.     calcium carbonate-vitamin D2 500 mg(1,250mg) -200 unit tablet Take 1 tablet by mouth 3 (three) times a day.     cholecalciferol, vitamin D3, (VITAMIN D3) 2,000 unit cap Take 1,000 Units by mouth daily.      levothyroxine (SYNTHROID, LEVOTHROID) 50 MCG tablet Take 1 tablet (50 mcg total) by mouth Daily at 6:00 am.     multivitamin therapeutic (THERAGRAN) tablet Take 1 tablet by mouth daily.     raloxifene (EVISTA) 60 mg tablet Take 1 tablet (60 mg total) by mouth daily.     rosuvastatin (CRESTOR) 10 MG tablet TAKE 1 TABLET BY MOUTH EVERY DAY     Allergies   Allergen Reactions     Lipitor [Atorvastatin] Myalgia     Statins-Hmg-Coa Reductase Inhibitors Myalgia        Family and Social History   Family History   Problem Relation Age of Onset     Heart attack Father      Diabetes Mother      Osteoarthritis Mother      Cancer Sister      breast     Hyperlipidemia Sister      Hypothyroidism Sister      Hyperlipidemia Brother         Social History   Substance Use Topics     Smoking status: Never Smoker     Smokeless tobacco: Never Used     Alcohol use No        Physical Exam       Physical Exam  /80  Pulse 70  Temp 97.6  F (36.4  C) (Oral)   Ht 5' 4.75\" (1.645 m)  Wt 145 lb (65.8 kg)  LMP  (LMP " Unknown)  SpO2 100%  BMI 24.32 kg/m2  General appearance: alert, appears stated age, cooperative and no distress  Head: Normocephalic, without obvious abnormality, atraumatic  Nose: Nares normal. Septum midline. Mucosa normal. No drainage or sinus tenderness.  Throat: lips, mucosa, and tongue normal; teeth and gums normal  Neck: no adenopathy, no carotid bruit, no JVD, supple, symmetrical, trachea midline and thyroid not enlarged, symmetric, no tenderness/mass/nodules  Lungs: clear to auscultation bilaterally and no rales and wheezes  Heart: regular rate and rhythm, S1, S2 normal, no murmur, click, rub or gallop  Abdomen: soft, non-tender; bowel sounds normal; no masses,  no organomegaly  Extremities: extremities normal, atraumatic, no cyanosis or edema  Skin: Skin color, texture, turgor normal. No rashes or lesions     Additional Information        Wolf Casillas MD  Internal Medicine  Contact me at [phone]     Additional Information   Current Outpatient Prescriptions   Medication Sig     aspirin 81 MG EC tablet Take 81 mg by mouth daily.     calcium carbonate-vitamin D2 500 mg(1,250mg) -200 unit tablet Take 1 tablet by mouth 3 (three) times a day.     cholecalciferol, vitamin D3, (VITAMIN D3) 2,000 unit cap Take 1,000 Units by mouth daily.      levothyroxine (SYNTHROID, LEVOTHROID) 50 MCG tablet Take 1 tablet (50 mcg total) by mouth Daily at 6:00 am.     multivitamin therapeutic (THERAGRAN) tablet Take 1 tablet by mouth daily.     raloxifene (EVISTA) 60 mg tablet Take 1 tablet (60 mg total) by mouth daily.     rosuvastatin (CRESTOR) 10 MG tablet TAKE 1 TABLET BY MOUTH EVERY DAY     Allergies   Allergen Reactions     Lipitor [Atorvastatin] Myalgia     Statins-Hmg-Coa Reductase Inhibitors Myalgia     Social History   Substance Use Topics     Smoking status: Never Smoker     Smokeless tobacco: Never Used     Alcohol use No         Time: total time spent with the patient was 25 minutes of which >50% was  spent in counseling and coordination of care

## 2021-06-15 NOTE — PROGRESS NOTES
OFFICE VISIT NOTE  Skylar Spaulding   62 y.o. female            Assessment/Plan for  Skylar Spaulding is a 62 y.o. female.  No Patient Care Coordination Note on file.       1. Preventative health care  Doing well  - Glycosylated Hemoglobin A1c    2. Hyperlipidemia  On Rx  - Lipid Cascade    3. Breast cancer  Reviewed recent oncology follow-up doing well.  On Evista  - Vitamin D, Total (25-Hydroxy)    4. Hypothyroidism  On Rx  - Thyroid [place]    5. Sinusitis  Having to [place]  - azithromycin (ZITHROMAX Z-POLO) 250 MG tablet; Take 2 tablets on the first day followed by 1 tablet daily for 4 days  Dispense: 6 tablet; Refill: 0          Plan:  Laboratory  Same meds  Reviewed calcium D supplementation  Reviewed bone density-normal 11/2016  Reviewed colorectal cancer screening  Discussed cola guard  Prophylactic azithromycin    Patient Instructions     [email or web address]        Combined calcium and vitamin D supplementation appears to reduce fracture risk in older adults.  ?The optimal intake of calcium and vitamin D in postmenopausal women with osteoporosis, 1500 mg of calcium daily (total diet plus supplement) in divided doses and 2532-0660 international units of vitamin D daily are advised.        Foods and drinks with calcium     Food Calcium, milligrams   Milk (skim, 2 percent, or whole, 8 oz [240 mL]) 300   Yogurt (6 oz [168 g]) 250   Orange juice (with calcium, 8 oz [240 mL]) 300   Tofu with calcium (1/2 cup [113 g]) 435   Cheese (1 oz [28 g]) 195 to 335 (hard cheese = higher calcium)   Cottage cheese (1/2 cup [113 g]) 130   Ice cream or frozen yogurt (1/2 cup [113 g]) 100   Soy milk (8 oz [240 mL]) 300   Beans (1/2 cup cooked [113 g]) 60 to 80   Dark, leafy green vegetables (1/2 cup cooked [113 g]) 50 to 135   Almonds (24 whole) 70   Orange (1 medium) 60        Selected food sources of vitamin D   Food International units per serving   Cod liver oil, 1 tablespoon (15 mL) 1360   [place] (sockeye), cooked, 3 ounces (85 g) 794    Mushrooms that have been exposed to ultraviolet light to increase vitamin D, 3 ounces (85 g) (not yet commonly available) 400   Mackerel, cooked, 3 ounces (85 g) 388   Tuna fish, canned in water, drained, 3 ounces (85 g) 154   Milk, nonfat, reduced fat, and whole, vitamin D-fortified, 8 ounces (240 mL) 115 to 124   Orange juice fortified with vitamin D, 8 ounces (240 mL) (check product labels, as amount of added vitamin D varies) 100   Yogurt, fortified with 20 percent of the DV for vitamin D, 6 ounces (180 mL) (more heavily fortified yogurts provide more of the DV) 80   Margarine, fortified, 1 tablespoon (15 g) 60   Sardines, canned in oil, drained, 2 sardines 46   Liver, beef, cooked, 3.5 ounces (100 g) 46   Ready-to-eat cereal, fortified with 10 percent of the DV for vitamin D, 6 to 8 ounces (227 g) (more heavily fortified cereals might provide more of the DV) 40   Egg, 1 whole (vitamin D is found in yolk) 25   Cheese, Swiss, 1 ounce (29 g) 6     Elemental calcium and Vitamin D content per pill of different calcium supplements    Elemental Ca/tablet Ca compound Vitamin D   Caltrate 600 + D3 600 mg Carbonate 800 units   Caltrate 600 + D3 Soft Chews  600 mg  Carbonate  800 units    Caltrate Gummy Bites 250 mg  Tribasic calcium phosphate 400 units   Caltrate 600 + D3 Plus Minerals Chewables  600 mg Carbonate 800 units   Caltrate 600 + D3 Plus Minerals Minis 300 mg Carbonate 800 units   Citracal Petites  200 mg  Citrate 250 units   Citracal Maximum  315 mg Citrate 250 units   Citracal Plus Magnesium & Minerals 250 mg  Citrate 125 units   Citracal + D Slow Release  600 mg Citrate 500 units   Citracal Calcium Gummies 250 mg Tricalcium phosphate 500 units   Citracal Calcium Pearls  200 mg  Carbonate 500 units   OsCal Calcium + D3 500 mg Carbonate 200 units   Oscal Extra + D3 500 mg Carbonate 600 units   Oscal Ultra 600 mg Carbonate 500 units   Oscal Chewable  500  Carbonate 600 units   Tums 200 mg Carbonate -   Tums  "Extra Strength 300 mg Carbonate -   Tums Ultra Strength 400 mg Carbonate -   Tums Chewy Delights  400 mg  Carbonate -   Viactiv Calcium plus D + K 500 mg Carbonate 500 units  (or 1000 units in sugar-free)   Units: international units.         website  [email or web address]              Cologuard    Fecal DNA tests -- Colorectal neoplasms shed DNA in the stool from which the DNA can be isolated and tested for the presence of mutations and epigenetic changes acquired during carcinogenesis [21,51-55].  A first-generation stool DNA test was withdrawn in 2012. A newer test, Cologuard, combines stool DNA testing using a gene amplification technique (to allow detection of low frequency mutations with increased sensitivity for advanced adenomas), for patterns of DNA methylation and with testing for hemoglobin. In a comparison of this test with one round of a fecal immunochemical test (FIT) in 9989 people who also underwent colonoscopy, the sensitivity for colorectal cancer of the stool DNA and FIT tests were 92.3 percent and 73.8 percent respectively [56]. Sensitivity of the DNA test was not affected by cancer stage or location of the colonic lesion. Specificity was lower for the DNA test than for the FIT (86.6 and 94.9 percent respectively). However, since screening tests are performed at regular intervals, and intervals for performing repeat FIT testing are most likely shorter than for stool DNA testing, results of one round of screening may not represent comparative effectiveness over a period of time. Based on these results, Cologuard has been approved by the US Food and Drug Administration (FDA) in August 2014, as a screening test for colorectal carcinoma to be followed, when abnormal, by diagnostic colonoscopy [57].  The implications of \"false positives,\" abnormal DNA testing in patients who are not found to have colonic lesions on colonoscopy, is uncertain. In a study of screening with three modalities (stool DNA, colonoscopy, and " fecal immunochemical tests) in average-risk patients, nearly 10 percent of those with an entirely negative colonoscopy had a positive stool DNA test [56]. It is not known whether these positive tests are clinically important, for example, by representing carcinomas elsewhere in the gastrointestinal tract or mucosal predisposition to cancer.  The appropriate interval between screening fecal DNA tests is unknown. As of October 2014 the Centers for Medicare and Medicaid Services (CMS) include coverage for this test once every three years for asymptomatic Medicare beneficiaries age 50 to 84 years at average risk for CRC [58]. Stool DNA testing is not currently incorporated into screening guidelines from the US Preventive Services Task Force (USPSTF), which were prepared before evidence regarding the current generation fecal test was available                  Diagnoses and all orders for this visit:    Preventative health care  -     Glycosylated Hemoglobin A1c    Hyperlipidemia  -     Lipid Cascade    Breast cancer  -     Vitamin D, Total (25-Hydroxy)    Hypothyroidism  -     Thyroid Cascade    Sinusitis  -     azithromycin (ZITHROMAX Z-POLO) 250 MG tablet; Take 2 tablets on the first day followed by 1 tablet daily for 4 days  Dispense: 6 tablet; Refill: 0        Medications after visit  Current Outpatient Prescriptions   Medication Sig Dispense Refill     aspirin 81 MG EC tablet Take 81 mg by mouth daily.       calcium carbonate-vitamin D2 500 mg(1,250mg) -200 unit tablet Take 1 tablet by mouth 3 (three) times a day.       cholecalciferol, vitamin D3, (VITAMIN D3) 2,000 unit cap Take 1,000 Units by mouth daily.        levothyroxine (SYNTHROID, LEVOTHROID) 50 MCG tablet Take 1 tablet (50 mcg total) by mouth Daily at 6:00 am. 100 tablet 3     multivitamin therapeutic (THERAGRAN) tablet Take 1 tablet by mouth daily.       raloxifene (EVISTA) 60 mg tablet Take 1 tablet (60 mg total) by mouth daily. 30 tablet 0      rosuvastatin (CRESTOR) 10 MG tablet TAKE 1 TABLET BY MOUTH EVERY  tablet 0     azithromycin (ZITHROMAX Z-POLO) 250 MG tablet Take 2 tablets on the first day followed by 1 tablet daily for 4 days 6 tablet 0     No current facility-administered medications for this visit.              This provider spent greater than 25 min. face-to-face time with the patient and/or his family.  More than half this time was spent in counseling and or coordination of care with other providers or agencies which were consistent with the nature of this patient's problems which are listed and described in the assessment and plan.        Royal Faith MD  Internal medicine  [facility]  [phone]  [email or web address]      This is an electronically verified report by Royal Faith M.D.  (Note created with Dragon voice recognition and unintended spelling errors and word substitutions may occur)               Subjective:   Chief Complaint:  Annual Exam (Fasting)    Patient is here for follow-up of her hyperlipidemia, hypothyroidism    Hyperlipoproteinemia-patient is tolerating medication.  There are no myalgia, arthralgia, weakness.  No Bowel issues.  Liver profile has been normal.  Patient has met cholesterol goals.    HYPOTHYROIDISM -on replacement.  No symptoms of hypothyroidism-fatigue, temperature intolerance, dry skin, constipation, weight gain, edema, diplopia.  No symptoms of hyperthyroidism-tremor, weight loss, palpitations, diaphoresis, heat intolerance, diarrhea.  Current dose of thyroid medication noted on  medication list and verified.  Last TSH and T4 reviewed    Gets her regular GYN check-Dr. Cooper    Recent oncology note-reviewed.  Normal routine labs CBC BMP   liver    Review of Systems:     Extensive 10-point review of systems was performed. Please see the HPI for problem specific pertinent review of systems.     Patient does note appetite is good bowels are regular    She has  "occasional left anterior leg pain from thigh down to mid tibia area discomfort at at bedtime.  None for the last month.  She has had this on and off for years.  She had a distant EMG    Otherwise, the following systems are noncontributory including constitutional, eyes, ears, nose and throat, cardiovascular, respiratory, gastrointestinal, genitourinary, musculoskeletal,neurological, skin and/or breast, endocrine, hematologic/lymph, allergic/immunologic and psychiatric.              Medications:  Current Outpatient Prescriptions on File Prior to Visit   Medication Sig     aspirin 81 MG EC tablet Take 81 mg by mouth daily.     calcium carbonate-vitamin D2 500 mg(1,250mg) -200 unit tablet Take 1 tablet by mouth 3 (three) times a day.     cholecalciferol, vitamin D3, (VITAMIN D3) 2,000 unit cap Take 1,000 Units by mouth daily.      levothyroxine (SYNTHROID, LEVOTHROID) 50 MCG tablet Take 1 tablet (50 mcg total) by mouth Daily at 6:00 am.     multivitamin therapeutic (THERAGRAN) tablet Take 1 tablet by mouth daily.     raloxifene (EVISTA) 60 mg tablet Take 1 tablet (60 mg total) by mouth daily.     rosuvastatin (CRESTOR) 10 MG tablet TAKE 1 TABLET BY MOUTH EVERY DAY     [DISCONTINUED] celecoxib (CELEBREX) 200 MG capsule Take 1 capsule (200 mg total) by mouth daily.     No current facility-administered medications on file prior to visit.             Allergies:  Allergies   Allergen Reactions     Lipitor [Atorvastatin] Myalgia     Statins-Hmg-Coa Reductase Inhibitors Myalgia       PSFHx: Tobacco Status:  She  reports that she has never smoked. She has never used smokeless tobacco.   Alcohol Status:    History   Alcohol Use No       reports that she has never smoked. She has never used smokeless tobacco. She reports that she does not drink alcohol or use illicit drugs.    Objective:    /70 (Patient Site: Right Arm, Patient Position: Sitting, Cuff Size: Adult Regular)  Pulse 76  Resp 14  Ht 5' 4.75\" (1.645 m)  Wt " 139 lb 0.6 oz (63.1 kg)  LMP  (LMP Unknown)  SpO2 99%  Breastfeeding? No  BMI 23.32 kg/m2  Weight:   Wt Readings from Last 3 Encounters:   12/29/17 139 lb 0.6 oz (63.1 kg)   02/23/17 138 lb (62.6 kg)   11/28/16 137 lb 1.9 oz (62.2 kg)     BP Readings from Last 3 Encounters:   12/29/17 122/70   02/23/17 118/72   11/28/16 112/74         General-appears well, no acute distress.  Skin: Normal. No rash or lesion  Head:  Normocephalic, symmetric  Speech-clear  Eyes: Eyes midline full EOM.  External exams normal.  No icterus  Neck:  No palpable masses, lymphadenopathy or tenderness. No thyromegaly or goiter  Carotid Arteries:  Equal pulsations bilateral.No Bruit, normal upstroke  Chest Wall: No deformity or pain elicited on compression.  Respiratory:  Normal respiratory effort.  Lungs are clear with good breath sounds.  No dullness.  No wheezing.  Heart: Regular rhythm.  Normal sounding S1, S2 without S3, S4, murmurs, rubs, or gallops.  Extremities-excellent pulses no edema  Benign abdomen.   Gait-normal        Review of clinical lab tests  Lab Results   Component Value Date    WBC 4.4 11/21/2016    HGB 13.5 11/21/2016    HCT 41.1 11/21/2016     11/21/2016    CHOL 196 11/21/2016    TRIG 74 11/21/2016    HDL 73 11/21/2016    ALT 25 11/21/2016    AST 22 11/21/2016     11/21/2016    K 3.9 11/21/2016     11/21/2016    CREATININE 0.94 11/21/2016    BUN 13 11/21/2016    CO2 29 11/21/2016    TSH 1.81 11/21/2016    HGBA1C 5.9 11/28/2016       Glucose   Date/Time Value Ref Range Status   11/21/2016 09:55 AM 88 74 - 125 mg/dL Final   10/27/2015 09:23 AM 89 74 - 125 mg/dL Final     No results found for this or any previous visit (from the past 24 hour(s)).    RADIOLOGY: No results found.    Review of recent consultation-oncology

## 2021-06-17 NOTE — PROGRESS NOTES
Skylar I would go off the Evista    #1-oncology does not feel it is necessary    #2-  The gynecologist-Dr. Cooper who is an excellent gynecologist feels it is causing issues.    3.  Your bone density is okay        Sometimes opinions differ amongst physicians and sometimes physicians change their own opinion depending on circumstances.  I know this is frustrating.    I would concur with Dr. Kenneth Faith MD  Internal medicine  [facility]  [phone]  [email or web address]

## 2021-06-17 NOTE — PATIENT INSTRUCTIONS - HE
Patient Instructions by Viral Trevizo PA-C at 6/16/2019  9:30 AM     Author: Viral Trevizo PA-C Service: -- Author Type: Physician Assistant    Filed: 6/16/2019 10:12 AM Encounter Date: 6/16/2019 Status: Addendum    : Viral Trevizo PA-C (Physician Assistant)    Related Notes: Original Note by Viral Trevizo PA-C (Physician Assistant) filed at 6/16/2019 10:11 AM       Over-the-counter nasal steroid spray, follow packaging directions  Over-the-counter Mucinex, follow packaging directions  OTC Flonase, follow packaging directions  Hot packs 3 times per day to the forehead and face over the tender sinuses  Take amoxicillin as previously written  nasal saline salt water or saline irrigation with [place]  Antibiotic as written below.  Risks and benefits of the medication were gone over.  Indication for return to see urgent care or family practice provider for reevaluation and treatment.      Acute Bacterial Rhinosinusitis (ABRS)  Acute bacterial rhinosinusitis (ABRS) is an infection of your nasal cavity and sinuses. Its caused by bacteria. Acute means that youve had symptoms for less than 12 weeks.  Understanding your sinuses  The nasal cavity is the large air-filled space behind your nose. The sinuses are a group of spaces formed by the bones of your face. They connect with your nasal cavity. ABRS causes the tissue lining these spaces to become inflamed. Mucus may not drain normally. This leads to facial pain and other symptoms.  What causes ABRS?  ABRS most often follows an upper respiratory infection caused by a virus. Bacteria then infect the lining of your nasal cavity and sinuses. But you can also get ABRS if you have:    Nasal allergies    Long-term nasal swelling and congestion not caused by allergies    Blockage in the nose  Symptoms of ABRS  The symptoms of ABRS may be different for each person, and can include:    Nasal congestion    Runny nose    Fluid draining from the nose down the throat  (postnasal drip)    Headache    Cough    Pain in the sinuses    Thick, colored fluid from the nose (mucus)    Fever  Diagnosing ABRS  ABRS may be diagnosed if youve had an upper respiratory infection like a cold and cough for longer than 10 to 14 days. Your health care provider will ask about your symptoms and your medical history. The provider will check your vital signs, including your temperature. Youll have a physical exam. The health care provider will check your ears, nose, and throat. You likely wont need any tests. If ABRS comes back, you may have a culture or other tests.  Treatment for ABRS  Treatment may include:    Antibiotic medicine. This is for symptoms that last for at least 10 to 14 days.    Nasal corticosteroid medicine. Drops or spray used in the nose can lessen swelling and congestion.    Over-the-counter pain medicine. This is to lessen sinus pain and pressure.    Nasal decongestant medicine. Spray or drops may help to lessen congestion. Do not use them for more than a few days.    Salt wash (saline irrigation). This can help to loosen mucus.  Possible complications of ABRS  ABRS may come back or become long-term (chronic).  In rare cases, ABRS may cause complications such as:     Inflamed tissue around the brain and spinal cord (meningitis)    Inflamed tissue around the eyes (orbital cellulitis)    Inflamed bones around the sinuses (osteitis)  These problems may need to be treated in a hospital with intravenous (IV) antibiotic medicine or surgery.  When to call the health care provider  Call your health care provider if you have any of the following:    Symptoms that dont get better, or get worse    Symptoms that dont get better after 3 to 5 days on antibiotics    Trouble seeing    Swelling around your eyes    Confusion or trouble staying awake   Date Last Reviewed: 3/3/2015    5770-2814 [employer]. [address] [place] [address], [place] [address]. All rights reserved. This information  is not intended as a substitute for professional medical care. Always follow your healthcare professional's instructions.

## 2021-06-20 NOTE — LETTER
Letter by Royal Faith MD at      Author: Royal Faith MD Service: -- Author Type: --    Filed:  Encounter Date: 1/16/2020 Status: Signed         Skylar Guerrero [place]  [facility] [address]             January 16, 2020         Dear Ms. Spaulding,    Below are the results from your recent visit:    Resulted Orders   Urinalysis-UC if Indicated   Result Value Ref Range    Color, UA Yellow Colorless, Yellow, Straw, Light Yellow    Clarity, UA Clear Clear    Glucose, UA Negative Negative    Bilirubin, UA Negative Negative    Ketones, UA 15 mg/dL (!) Negative    Specific Gravity, UA 1.025 1.005 - 1.030    Blood, UA Moderate (!) Negative    pH, UA 5.5 5.0 - 8.0    Protein, UA Negative Negative mg/dL    Urobilinogen, UA 0.2 E.U./dL 0.2 E.U./dL, 1.0 E.U./dL    Nitrite, UA Negative Negative    Leukocytes, UA Negative Negative    Bacteria, UA None Seen None Seen hpf    RBC, UA 0-2 None Seen, 0-2 hpf    WBC, UA None Seen None Seen, 0-5 hpf    Squam Epithel, UA None Seen None Seen, 0-5 lpf    Mucus, UA Few (!) None Seen lpf    Narrative    UC not indicated   Basic Metabolic Panel   Result Value Ref Range    Sodium 141 136 - 145 mmol/L    Potassium 4.0 3.5 - 5.0 mmol/L    Chloride 104 98 - 107 mmol/L    CO2 25 22 - 31 mmol/L    Anion Gap, Calculation 12 5 - 18 mmol/L    Glucose 84 70 - 125 mg/dL    Calcium 9.8 8.5 - 10.5 mg/dL    BUN 14 8 - 22 mg/dL    Creatinine 0.83 0.60 - 1.10 mg/dL    GFR MDRD Af Amer >60 >60 mL/min/1.73m2    GFR MDRD Non Af Amer >60 >60 mL/min/1.73m2    Narrative    Fasting Glucose reference range is 70-99 mg/dL per  American Diabetes Association (ADA) guidelines.   Hepatic Profile   Result Value Ref Range    Bilirubin, Total 0.6 0.0 - 1.0 mg/dL    Bilirubin, Direct 0.2 <=0.5 mg/dL    Protein, Total 6.9 6.0 - 8.0 g/dL    Albumin 4.1 3.5 - 5.0 g/dL    Alkaline Phosphatase 62 45 - 120 U/L    AST 22 0 - 40 U/L    ALT 19 0 - 45 U/L   Lipid Cascade   Result Value Ref Range    Cholesterol 241 (H)  <=199 mg/dL    Triglycerides 105 <=149 mg/dL    HDL Cholesterol 75 >=50 mg/dL    LDL Calculated 145 (H) <=129 mg/dL    Patient Fasting > 8hrs? Yes    Thyroid Cascade   Result Value Ref Range    TSH 1.09 0.30 - 5.00 uIU/mL   Vitamin D, Total (25-Hydroxy)   Result Value Ref Range    Vitamin D, Total (25-Hydroxy) 39.7 30.0 - 80.0 ng/mL    Narrative    Deficiency <10.0 ng/mL  Insufficiency 10.0-29.9 ng/mL  Sufficiency 30.0-80.0 ng/mL  Toxicity (possible) >100.0 ng/mL   HM1 (CBC with Diff)   Result Value Ref Range    WBC 6.5 4.0 - 11.0 thou/uL    RBC 4.54 3.80 - 5.40 mill/uL    Hemoglobin 13.8 12.0 - 16.0 g/dL    Hematocrit 40.7 35.0 - 47.0 %    MCV 90 80 - 100 fL    MCH 30.5 27.0 - 34.0 pg    MCHC 34.0 32.0 - 36.0 g/dL    RDW 12.7 11.0 - 14.5 %    Platelets 254 140 - 440 thou/uL    MPV 7.6 7.0 - 10.0 fL    Neutrophils % 56 50 - 70 %    Lymphocytes % 34 20 - 40 %    Monocytes % 7 2 - 10 %    Eosinophils % 2 0 - 6 %    Basophils % 1 0 - 2 %    Neutrophils Absolute 3.7 2.0 - 7.7 thou/uL    Lymphocytes Absolute 2.2 0.8 - 4.4 thou/uL    Monocytes Absolute 0.5 0.0 - 0.9 thou/uL    Eosinophils Absolute 0.1 0.0 - 0.4 thou/uL    Basophils Absolute 0.0 0.0 - 0.2 thou/uL   Glycosylated Hemoglobin A1C   Result Value Ref Range    Hemoglobin A1c 6.2 (H) 3.5 - 6.0 %     Glucose   Date/Time Value Ref Range Status   01/08/2020 01:55 PM 84 70 - 125 mg/dL Final   11/30/2018 10:51 AM 93 70 - 125 mg/dL Final   11/21/2016 09:55 AM 88 74 - 125 mg/dL Final   10/27/2015 09:23 AM 89 74 - 125 mg/dL Final         I enjoyed seeing you in the office at your appointment.  I am pleased with the results of your lab tests.       Your A1c is slightly high but your blood sugars are normal  The definition of diabetes is repeated sugars above 125 or repeated hemoglobin A1c's above 6.5.    All your other lab looks fine  I would not adjust your Crestor    Royal Faith MD  Internal medicine  [facility]  [facility]  [phone]  [email or web address]         Please call with questions or contact us using [employer].    Sincerely,        Electronically signed by Royal Faith MD

## 2021-06-22 NOTE — PROGRESS NOTES
Skylar    All is well  I addressed the a1c    Cholesterol ok  You are on statin and that greatly lowers risk of mi,stroke irregarless of the numbers.  Coronary calcium score will guide us regarding any further dosing    Royal Faith MD  Internal medicine  [facility]  [phone]  [email or web address]

## 2021-06-22 NOTE — PROGRESS NOTES
HISTORY/PHYSICAL EXAM  Skylar Spaulding   63 y.o. female  Is here for a physical healthcare maintenance examination        Assessment/Plan for  Skylar Spaulding is a 63 y.o. female.       1.  Annual physical exam-healthy woman  2.  Breast cancer-distant-not on active therapy  3.  Postmenopausal-asymptomatic  4.  Hypothyroidism-on Rx  5.  Hyperlipidemia-we will check coronary calcium score.  She is tolerating the Crestor.  She does have problem with other lipids.  She is asymptomatic from a coronary standpoint.  6.  Colorectal cancer screening.  Difficult tortuous sigmoid.  Last time virtual colonoscopy.  Consider cola guard testing with flex sig due to intermittent rectal bleeding.  She will call Dr. Alma Reynolds her colonoscopist      Plan:  1.  Laboratory  2.  Coronary calcium score  3.  Bone density  4.  Laboratory including vitamin D A1c lipid  5.  Routine ECG        Patient Instructions   Question flex sig with occ rectal bleeding  cologuard every three years      Diagnoses and all orders for this visit:    Preventative health care  -     DXA Bone Density Scan; Future; Expected date: 11/30/2018  -     HM1(CBC and Differential)  -     Erythrocyte Sedimentation Rate  -     Urinalysis-UC if Indicated  -     Basic Metabolic Panel  -     Hepatic Profile  -     Lipid Cascade  -     Thyroid Cascade  -     Vitamin D, Total (25-Hydroxy)  -     HM1 (CBC with Diff)  -     Glycosylated Hemoglobin A1c    Malignant neoplasm of left breast in female, estrogen receptor negative, unspecified site of breast (H)    Family history of colon cancer    Asymptomatic postmenopausal status    Hypothyroidism, unspecified type  -     Thyroid Cascade    Hyperlipidemia, unspecified hyperlipidemia type  -     CT Cardiac Calcium Score; Future; Expected date: 11/30/2018  -     Electrocardiogram Perform - Clinic  -     CK Total    Hyperlipidemia  -     rosuvastatin (CRESTOR) 10 MG tablet; Take 0.5 tablets (5 mg total) by mouth daily.  Dispense:  100 tablet; Refill: 0  -     Glycosylated Hemoglobin A1c    Hypothyroidism  -     levothyroxine (SYNTHROID, LEVOTHROID) 50 MCG tablet; TAKE 1 TABLET BY MOUTH DAILY.  Dispense: 100 tablet; Refill: 2            Medications:  Medications after visit  Current Outpatient Medications   Medication Sig Dispense Refill     aspirin 81 MG EC tablet Take 81 mg by mouth daily.       calcium carbonate-vitamin D2 500 mg(1,250mg) -200 unit tablet Take 1 tablet by mouth 3 (three) times a day.       cholecalciferol, vitamin D3, (VITAMIN D3) 2,000 unit cap Take 1,000 Units by mouth daily.        levothyroxine (SYNTHROID, LEVOTHROID) 50 MCG tablet TAKE 1 TABLET BY MOUTH DAILY. 100 tablet 2     multivitamin therapeutic (THERAGRAN) tablet Take 1 tablet by mouth daily.       rosuvastatin (CRESTOR) 10 MG tablet Take 0.5 tablets (5 mg total) by mouth daily. 100 tablet 0     No current facility-administered medications for this visit.               Royal Faith MD  Internal medicine  [facility]  [phone]  [email or web address]      This is an electronically verified report by Royal Faith M.D.  (Note created with Dragon voice recognition and unintended spelling errors and word substitutions may occur)        SUBJECTIVE/HPI    Chief Complaint:  Annual Exam (Pt states that she has no concerns-mammo was done 11/27/18 @ [facility]-pt has questions about colon cancer screening options)  Here for physical    She is feeling well    She does have a number of questions dealing with colorectal screening, Evista, statin therapy, coronary calcium score.    Patient is handling her cholesterol medication-Crestor.  No myalgia or arthralgia        Review of Systems:   Extensive 14-point comprehensive review of systems was performed.   Patient reports  1.  Works out  2.  Active.  3.  Good appetite  Regular bowel movements  No melena or hematochezia  Occasional spotting with bowel movements-streaks    Otherwise, the  following systems are negative including constitutional, eyes, ears, nose and throat, cardiovascular, respiratory, gastrointestinal, genitourinary, musculoskeletal,neurological, skin and/or breast, endocrine, hematologic/lymph, allergic/immunologic and psychiatric.  Surgical History  Past Surgical History:   Procedure Laterality Date     BREAST BIOPSY      Left     BREAST LUMPECTOMY      left     DENTAL SURGERY       POLYPECTOMY      uterine polyp        Medical History     Past Medical History:   Diagnosis Date     Benign positional vertigo      Breast cancer (H)      ER- VT+ carcinoma of breast (H)      Hyperlipidemia      Menopause      Microscopic hematuria      Osteoarthritis      Patient Active Problem List    Diagnosis Date Noted     Hyperlipidemia 11/03/2015     Postmenopausal 11/03/2015     Breast cancer (H) 11/03/2015        Family History  Family History   Problem Relation Age of Onset     Heart attack Father      Diabetes Mother      Osteoarthritis Mother      Cancer Sister         breast     Hyperlipidemia Sister      Hypothyroidism Sister      Hyperlipidemia Brother              Medications:  Current Outpatient Medications on File Prior to Visit   Medication Sig     aspirin 81 MG EC tablet Take 81 mg by mouth daily.     calcium carbonate-vitamin D2 500 mg(1,250mg) -200 unit tablet Take 1 tablet by mouth 3 (three) times a day.     cholecalciferol, vitamin D3, (VITAMIN D3) 2,000 unit cap Take 1,000 Units by mouth daily.      multivitamin therapeutic (THERAGRAN) tablet Take 1 tablet by mouth daily.     [DISCONTINUED] levothyroxine (SYNTHROID, LEVOTHROID) 50 MCG tablet TAKE 1 TABLET BY MOUTH DAILY AT 6 AM     [DISCONTINUED] rosuvastatin (CRESTOR) 10 MG tablet TAKE 1 TABLET BY MOUTH EVERY DAY     [DISCONTINUED] raloxifene (EVISTA) 60 mg tablet Take 1 tablet (60 mg total) by mouth daily.     No current facility-administered medications on file prior to visit.           Allergies:  Allergies   Allergen  "Reactions     Lipitor [Atorvastatin] Myalgia     Statins-Hmg-Coa Reductase Inhibitors Myalgia       PSFHx:   Social History     Socioeconomic History     Marital status:      Spouse name: Not on file     Number of children: Not on file     Years of education: Not on file     Highest education level: Not on file   Social Needs     Financial resource strain: Not on file     Food insecurity - worry: Not on file     Food insecurity - inability: Not on file     Transportation needs - medical: Not on file     Transportation needs - non-medical: Not on file   Occupational History     Not on file   Tobacco Use     Smoking status: Never Smoker     Smokeless tobacco: Never Used   Substance and Sexual Activity     Alcohol use: No     Drug use: No     Sexual activity: Not on file   Other Topics Concern     Not on file   Social History Narrative    Adin-diabetes    Kena Ogden    Native of [facility]                       Objective:   /64 (Patient Site: Right Arm, Patient Position: Sitting, Cuff Size: Adult Regular)   Pulse 76   Ht 5' 4.5\" (1.638 m)   Wt 134 lb (60.8 kg)   LMP  (LMP Unknown)   SpO2 99% Comment: RA  BMI 22.65 kg/m    Weight:   Wt Readings from Last 3 Encounters:   11/30/18 134 lb (60.8 kg)   01/30/18 145 lb (65.8 kg)   12/29/17 139 lb 0.6 oz (63.1 kg)       General-appears well, no acute distress.  Skin: Normal. No rash or lesion  Lymph Nodes: None palpable-including neck, axilla, inguinal, epitrochlear.  Head:  Normocephalic.    Eyes: Midline.  Equal size., full ROM.  External exams normal.  No icterus  Ears:  Normal pinnae, canals, and TM's.    Nose:  Patent, without deformity.    Throat:  Moist mucous membranes without lesions, erythema, or exudate.    Neck: No palpable masses, lymphadenopathy or tenderness.No thyromegaly or goiter.  No thyroid nodule.  Carotid Arteries:  No Bruit.  Carotid upstroke normal  Chest Wall: No deformity or pain elicited on " compression.  Respiratory:  Normal respiratory effort.  Lungs are clear with good breath sounds.  No dullness.  No wheezing.  Heart: Regular rhythm.  Normal sounding S1, S2 without S3, S4, murmurs, rubs, or gallops.    Abdomen:  The abdomen was flat, soft and nontender without guarding rebound or masses.  There are normal bowel sounds.  There is no hepatosplenomegaly.  There is no palpable enlargement of the aorta.  Breast pelvic rectal-not performed  Extremities:  Full ROM without limitation, deformity or edema.    4+ pulses  Neurologic-intact- No focal deficit.  Speech clear.  Coordination normal.  Strength symmetric  Orthopedic-no arthropathy.          Laboratory: Ordered  Recent Results (from the past 24 hour(s))   Electrocardiogram Perform - Clinic   Result Value Ref Range    SYSTOLIC BLOOD PRESSURE  mmHg    DIASTOLIC BLOOD PRESSURE  mmHg    VENTRICULAR RATE 59 BPM    ATRIAL RATE 59 BPM    P-R INTERVAL 156 ms    QRS DURATION 76 ms    Q-T INTERVAL 428 ms    QTC CALCULATION (BEZET) 423 ms    P Axis 70 degrees    R AXIS 75 degrees    T AXIS 32 degrees    MUSE DIAGNOSIS       Sinus bradycardia  Otherwise normal ECG  When compared with ECG of 24-OCT-2011 12:02,  No significant change was found     Erythrocyte Sedimentation Rate   Result Value Ref Range    Sed Rate 16 0 - 20 mm/hr   HM1 (CBC with Diff)   Result Value Ref Range    WBC 3.7 (L) 4.0 - 11.0 thou/uL    RBC 4.36 3.80 - 5.40 mill/uL    Hemoglobin 13.1 12.0 - 16.0 g/dL    Hematocrit 40.7 35.0 - 47.0 %    MCV 93 80 - 100 fL    MCH 30.0 27.0 - 34.0 pg    MCHC 32.2 32.0 - 36.0 g/dL    RDW 12.4 11.0 - 14.5 %    Platelets 219 140 - 440 thou/uL    MPV 10.5 8.5 - 12.5 fL    Neutrophils % 40 (L) 50 - 70 %    Lymphocytes % 47 (H) 20 - 40 %    Monocytes % 10 2 - 10 %    Eosinophils % 2 0 - 6 %    Basophils % 1 0 - 2 %    Neutrophils Absolute 1.5 (L) 2.0 - 7.7 thou/uL    Lymphocytes Absolute 1.7 0.8 - 4.4 thou/uL    Monocytes Absolute 0.4 0.0 - 0.9 thou/uL     Eosinophils Absolute 0.1 0.0 - 0.4 thou/uL    Basophils Absolute 0.0 0.0 - 0.2 thou/uL   Glycosylated Hemoglobin A1c   Result Value Ref Range    Hemoglobin A1c 6.1 (H) 3.5 - 6.0 %

## 2021-06-26 ENCOUNTER — HEALTH MAINTENANCE LETTER (OUTPATIENT)
Age: 66
End: 2021-06-26

## 2021-06-27 NOTE — PROGRESS NOTES
Progress Notes by Viral Trevizo PA-C at 6/16/2019  9:30 AM     Author: Viral Trevizo PA-C Service: -- Author Type: Physician Assistant    Filed: 6/19/2019  3:04 PM Encounter Date: 6/16/2019 Status: Signed    : Viral Trevizo PA-C (Physician Assistant)       Subjective:      Patient ID: Skylar Spaulding is a 63 y.o. female.    Chief Complaint:    HPI  Skylar Spaulding is a 63 y.o. female who presents today complaining of two day acute onset of sore throat and odynophagia with white spots in her throat facial pain and congestion.  Patient denies fever, chills, night sweats, fatigue, vomiting, diarrhea, skin rash, abdominal pain or urinary symptoms.      No known sick contacts for strep throat.    Has not tried treatment for this over-the-counter.    Past Medical History:   Diagnosis Date   ? Benign positional vertigo    ? Breast cancer (H)    ? ER- MA+ carcinoma of breast (H)    ? Hyperlipidemia    ? Menopause    ? Microscopic hematuria    ? Osteoarthritis        Past Surgical History:   Procedure Laterality Date   ? BREAST BIOPSY      Left   ? BREAST LUMPECTOMY      left   ? DENTAL SURGERY     ? POLYPECTOMY      uterine polyp        Family History   Problem Relation Age of Onset   ? Heart attack Father    ? Diabetes Mother    ? Osteoarthritis Mother    ? Cancer Sister         breast   ? Hyperlipidemia Sister    ? Hypothyroidism Sister    ? Hyperlipidemia Brother        Social History     Tobacco Use   ? Smoking status: Never Smoker   ? Smokeless tobacco: Never Used   Substance Use Topics   ? Alcohol use: No   ? Drug use: No       Review of Systems  As above in HPI, otherwise balance of Review of Systems are negative.    Objective:     /78   Pulse 78   Temp 97.8  F (36.6  C) (Oral)   Resp 18   Wt 138 lb (62.6 kg)   LMP  (LMP Unknown)   SpO2 97%   BMI 23.32 kg/m      Physical Exam  General: Patient is resting comfortably no acute distress is afebrile  HEENT: Head is normocephalic atraumatic   Patient  has pain to palpation over the frontal maxillary sinuses  eyes are PERRL EOMI sclera anicteric   TMs are clear bilaterally  Throat is with mild pharyngeal wall drainage and no erythema  No cervical lymphadenopathy present  LUNGS: Clear to auscultation bilaterally  HEART: Regular rate and rhythm  Skin: Without rash non-diaphoretic      Results for orders placed or performed in visit on 06/16/19   Rapid Strep A Screen-Throat   Result Value Ref Range    Rapid Strep A Antigen No Group A Strep detected, presumptive negative No Group A Strep detected, presumptive negative                     Assessment:     Procedures    The primary encounter diagnosis was Throat pain. A diagnosis of Acute non-recurrent frontal sinusitis was also pertinent to this visit.    Plan:     1. Throat pain  Rapid Strep A Screen-Throat    Group A Strep, RNA Direct Detection, Throat   2. Acute non-recurrent frontal sinusitis           Patient Instructions     Over-the-counter nasal steroid spray, follow packaging directions  Over-the-counter Mucinex, follow packaging directions  OTC Flonase, follow packaging directions  Hot packs 3 times per day to the forehead and face over the tender sinuses  Take amoxicillin as previously written  nasal saline salt water or saline irrigation with Yadira Solomon  Antibiotic as written below.  Risks and benefits of the medication were gone over.  Indication for return to see urgent care or family practice provider for reevaluation and treatment.      Acute Bacterial Rhinosinusitis (ABRS)  Acute bacterial rhinosinusitis (ABRS) is an infection of your nasal cavity and sinuses. Its caused by bacteria. Acute means that youve had symptoms for less than 12 weeks.  Understanding your sinuses  The nasal cavity is the large air-filled space behind your nose. The sinuses are a group of spaces formed by the bones of your face. They connect with your nasal cavity. ABRS causes the tissue lining these spaces to become inflamed.  Mucus may not drain normally. This leads to facial pain and other symptoms.  What causes ABRS?  ABRS most often follows an upper respiratory infection caused by a virus. Bacteria then infect the lining of your nasal cavity and sinuses. But you can also get ABRS if you have:    Nasal allergies    Long-term nasal swelling and congestion not caused by allergies    Blockage in the nose  Symptoms of ABRS  The symptoms of ABRS may be different for each person, and can include:    Nasal congestion    Runny nose    Fluid draining from the nose down the throat (postnasal drip)    Headache    Cough    Pain in the sinuses    Thick, colored fluid from the nose (mucus)    Fever  Diagnosing ABRS  ABRS may be diagnosed if youve had an upper respiratory infection like a cold and cough for longer than 10 to 14 days. Your health care provider will ask about your symptoms and your medical history. The provider will check your vital signs, including your temperature. Youll have a physical exam. The health care provider will check your ears, nose, and throat. You likely wont need any tests. If ABRS comes back, you may have a culture or other tests.  Treatment for ABRS  Treatment may include:    Antibiotic medicine. This is for symptoms that last for at least 10 to 14 days.    Nasal corticosteroid medicine. Drops or spray used in the nose can lessen swelling and congestion.    Over-the-counter pain medicine. This is to lessen sinus pain and pressure.    Nasal decongestant medicine. Spray or drops may help to lessen congestion. Do not use them for more than a few days.    Salt wash (saline irrigation). This can help to loosen mucus.  Possible complications of ABRS  ABRS may come back or become long-term (chronic).  In rare cases, ABRS may cause complications such as:     Inflamed tissue around the brain and spinal cord (meningitis)    Inflamed tissue around the eyes (orbital cellulitis)    Inflamed bones around the sinuses  (osteitis)  These problems may need to be treated in a hospital with intravenous (IV) antibiotic medicine or surgery.  When to call the health care provider  Call your health care provider if you have any of the following:    Symptoms that dont get better, or get worse    Symptoms that dont get better after 3 to 5 days on antibiotics    Trouble seeing    Swelling around your eyes    Confusion or trouble staying awake   Date Last Reviewed: 3/3/2015    0766-8257 The [employer]. [address] [place] [address], [place] [address]. All rights reserved. This information is not intended as a substitute for professional medical care. Always follow your healthcare professional's instructions.

## 2021-07-03 NOTE — ADDENDUM NOTE
Addendum Note by Brenda Arndt at 1/8/2020 12:40 PM     Author: Brenda Arndt Service: -- Author Type:     Filed: 1/8/2020  4:24 PM Encounter Date: 1/8/2020 Status: Signed    : Brenda Arndt ()    Addended by: BRENDA ARNDT on: 1/8/2020 04:24 PM        Modules accepted: Orders

## 2021-07-13 ENCOUNTER — RECORDS - HEALTHEAST (OUTPATIENT)
Dept: ADMINISTRATIVE | Facility: CLINIC | Age: 66
End: 2021-07-13

## 2021-09-21 DIAGNOSIS — E03.9 HYPOTHYROIDISM: ICD-10-CM

## 2021-09-21 RX ORDER — LEVOTHYROXINE SODIUM 50 UG/1
TABLET ORAL
Qty: 90 TABLET | Refills: 0 | Status: SHIPPED | OUTPATIENT
Start: 2021-09-21 | End: 2021-11-29

## 2021-09-21 NOTE — TELEPHONE ENCOUNTER
" Disp Refills Start End KING   levothyroxine (SYNTHROID, LEVOTHROID) 50 MCG tablet 90 tablet 3 12/10/2020  No   Sig - Route: Take 1 tablet (50 mcg total) by mouth daily. - Oral   Sent to pharmacy as: levothyroxine 50 mcg tablet (SYNTHROID, LEVOTHROID)   E-Prescribing Status: Receipt confirmed by pharmacy (12/10/2020 10:31 AM CST)     Too soon for refill.    Last Written Prescription Date:  12/10/2020  Last Fill Quantity: 90,  # refills: 3   Last office visit provider:  02/02/2021 with Dr Wray.    Requested Prescriptions   Pending Prescriptions Disp Refills     levothyroxine (SYNTHROID/LEVOTHROID) 50 MCG tablet [Pharmacy Med Name: LEVOTHYROXINE 0.05MG (50MCG) TAB] 90 tablet 3     Sig: TAKE 1 TABLET(50 MCG) BY MOUTH DAILY       Thyroid Protocol Passed - 9/21/2021  8:07 AM        Passed - Patient is 12 years or older        Passed - Recent (12 mo) or future (30 days) visit within the authorizing provider's specialty     Patient has had an office visit with the authorizing provider or a provider within the authorizing providers department within the previous 12 mos or has a future within next 30 days. See \"Patient Info\" tab in inbasket, or \"Choose Columns\" in Meds & Orders section of the refill encounter.              Passed - Medication is active on med list        Passed - Normal TSH on file in past 12 months     Recent Labs   Lab Test 02/02/21  1055   TSH 1.23              Passed - No active pregnancy on record     If patient is pregnant or has had a positive pregnancy test, please check TSH.          Passed - No positive pregnancy test in past 12 months     If patient is pregnant or has had a positive pregnancy test, please check TSH.               Kim Kidd 09/21/21 3:15 PM  "

## 2021-10-11 ENCOUNTER — TELEPHONE (OUTPATIENT)
Dept: INTERNAL MEDICINE | Facility: CLINIC | Age: 66
End: 2021-10-11

## 2021-10-19 ENCOUNTER — IMMUNIZATION (OUTPATIENT)
Dept: NURSING | Facility: CLINIC | Age: 66
End: 2021-10-19
Payer: COMMERCIAL

## 2021-10-19 DIAGNOSIS — Z23 HIGH PRIORITY FOR 2019-NCOV VACCINE: Primary | ICD-10-CM

## 2021-10-19 PROCEDURE — 91300 COVID-19,PF,PFIZER (12+ YRS): CPT

## 2021-10-19 PROCEDURE — 99207 PR NO CHARGE LOS: CPT

## 2021-10-19 PROCEDURE — 0004A COVID-19,PF,PFIZER (12+ YRS): CPT

## 2021-11-29 ENCOUNTER — OFFICE VISIT (OUTPATIENT)
Dept: INTERNAL MEDICINE | Facility: CLINIC | Age: 66
End: 2021-11-29
Payer: COMMERCIAL

## 2021-11-29 VITALS
SYSTOLIC BLOOD PRESSURE: 128 MMHG | DIASTOLIC BLOOD PRESSURE: 80 MMHG | HEIGHT: 66 IN | HEART RATE: 84 BPM | BODY MASS INDEX: 20.57 KG/M2 | WEIGHT: 128 LBS | OXYGEN SATURATION: 96 %

## 2021-11-29 DIAGNOSIS — M79.652 BILATERAL THIGH PAIN: ICD-10-CM

## 2021-11-29 DIAGNOSIS — E78.2 MIXED HYPERLIPIDEMIA: Primary | ICD-10-CM

## 2021-11-29 DIAGNOSIS — R73.03 PRE-DIABETES: ICD-10-CM

## 2021-11-29 DIAGNOSIS — R03.0 ELEVATED BP WITHOUT DIAGNOSIS OF HYPERTENSION: ICD-10-CM

## 2021-11-29 DIAGNOSIS — Z00.00 HEALTHCARE MAINTENANCE: ICD-10-CM

## 2021-11-29 DIAGNOSIS — M25.552 HIP PAIN, LEFT: ICD-10-CM

## 2021-11-29 DIAGNOSIS — M79.651 BILATERAL THIGH PAIN: ICD-10-CM

## 2021-11-29 DIAGNOSIS — E06.3 HYPOTHYROIDISM DUE TO HASHIMOTO'S THYROIDITIS: ICD-10-CM

## 2021-11-29 LAB
CHOLEST SERPL-MCNC: 221 MG/DL
CK SERPL-CCNC: 69 U/L (ref 30–190)
FASTING STATUS PATIENT QL REPORTED: YES
HBA1C MFR BLD: 5.8 % (ref 0–5.6)
HDLC SERPL-MCNC: 72 MG/DL
LDLC SERPL CALC-MCNC: 129 MG/DL
TRIGL SERPL-MCNC: 99 MG/DL
TSH SERPL DL<=0.005 MIU/L-ACNC: 1.59 UIU/ML (ref 0.3–5)

## 2021-11-29 PROCEDURE — 80061 LIPID PANEL: CPT | Performed by: INTERNAL MEDICINE

## 2021-11-29 PROCEDURE — 90471 IMMUNIZATION ADMIN: CPT | Performed by: INTERNAL MEDICINE

## 2021-11-29 PROCEDURE — 90732 PPSV23 VACC 2 YRS+ SUBQ/IM: CPT | Performed by: INTERNAL MEDICINE

## 2021-11-29 PROCEDURE — 83036 HEMOGLOBIN GLYCOSYLATED A1C: CPT | Performed by: INTERNAL MEDICINE

## 2021-11-29 PROCEDURE — 99214 OFFICE O/P EST MOD 30 MIN: CPT | Mod: 25 | Performed by: INTERNAL MEDICINE

## 2021-11-29 PROCEDURE — 84443 ASSAY THYROID STIM HORMONE: CPT | Performed by: INTERNAL MEDICINE

## 2021-11-29 PROCEDURE — 82550 ASSAY OF CK (CPK): CPT | Performed by: INTERNAL MEDICINE

## 2021-11-29 PROCEDURE — 36415 COLL VENOUS BLD VENIPUNCTURE: CPT | Performed by: INTERNAL MEDICINE

## 2021-11-29 RX ORDER — LEVOTHYROXINE SODIUM 50 UG/1
TABLET ORAL
Qty: 90 TABLET | Refills: 3 | Status: SHIPPED | OUTPATIENT
Start: 2021-11-29 | End: 2022-02-02

## 2021-11-29 RX ORDER — ATENOLOL 25 MG/1
12.5 TABLET ORAL DAILY
Qty: 34 TABLET | Refills: 3 | Status: SHIPPED | OUTPATIENT
Start: 2021-11-29 | End: 2022-02-01

## 2021-11-29 ASSESSMENT — MIFFLIN-ST. JEOR: SCORE: 1134.41

## 2021-11-29 NOTE — PATIENT INSTRUCTIONS
Good blood pressure is between 110-/80   Patient Education     Understanding Chronic Venous Insufficiency  Problems with the veins in the legs may lead to chronic venous insufficiency (CVI). CVI means that there is a long-term problem with the veins not being able to pump blood back to your heart. When this happens, blood stays in the legs and causes swelling and aching.   Two problems that may lead to chronic venous insufficiency are:    Damaged valves. Valves keep blood flowing from the legs through the blood vessels and back to the heart. When the valves are damaged, blood does not flow as well.     Deep vein thrombosis (DVT).  Blood clots may form in the deep veins of the legs. This may cause pain, redness, and swelling in the legs. It may also block the flow of blood back to the heart. Seek medical care right away if you have these symptoms.  A blood clot in the leg can also break off and travel to the lungs. This is called  pulmonary embolism (PE).  In the lungs, the clot can cut off the flow of blood. This may cause chest pain, trouble breathing, sweating, a fast heartbeat, coughing (may cough up blood), and fainting. This is a medical emergency and may cause death. Call 911 if you have these symptoms.  CVI can t be cured, but you can control leg swelling to reduce the likelihood of sores (ulcers).  Recognizing the symptoms  Be aware of the following:    If you stand or sit with your feet down for long periods, your legs may ache or feel heavy.    Swollen ankles are possibly the most common symptom of CVI.    As swelling increases, the skin over your ankles may show red spots or a brownish tinge. The skin may feel leathery or scaly, and may start to itch.    If swelling is not controlled, an ulcer (open wound) may form.  What you can do  Reduce your risk of developing ulcers by doing the following:    Increase blood flow back to your heart by elevating your legs, exercising daily, and wearing elastic  "stockings.    Boost blood flow in your legs by losing extra weight.    If you must stand or sit in one place for a period of time, keep your blood moving by wiggling your toes, shifting your body position, and rising up on the balls of your feet.    Michelle last reviewed this educational content on 10/1/2019    9437-1612 [employer]. All rights reserved. This information is not intended as a substitute for professional medical care. Always follow your healthcare professional's instructions.           Patient Education     Putting on Knee-High Compression Stockings   Step-by-Step    [employer] last reviewed this educational content on 12/1/2019 2000-2021 [employer]. All rights reserved. This information is not intended as a substitute for professional medical care. Always follow your healthcare professional's instructions.           Patient Education     T.E.D. Stockings   T.E.D. stockings are used to help stop blood clots from forming in the deep veins of your legs. T.E.D. stands for thrombo-embolic deterrent hose. They help prevent blood clots if you are immobile, such as you might be after certain types of surgeries. They look like support hose. But they are specially designed to put more pressure on your foot and ankle and less pressure at the upper end of the stocking. This keeps blood from pooling in your lower legs. .  When blood flow slows down, clots can form. The pressure from T.E.D. stockings helps blood flow in the deep leg veins. This reduces risk for clots when you also take anti-coagulation medicine.  It's important that you have the correct size of the T.E.D. stockings for them to work. Your doctor or nurse will give you the size you need. If you are having trouble putting your stockings on by yourself, ask your doctor or supply store about using a \"sock aid.\"   [employer] last reviewed this educational content on 12/1/2019 2000-2021 [employer]. All " rights reserved. This information is not intended as a substitute for professional medical care. Always follow your healthcare professional's instructions.

## 2021-11-29 NOTE — PROGRESS NOTES
Assessment & Plan     Hypothyroidism  Stable we will recheck    Mixed hyperlipidemia  She is on low-dose Crestor due to prior intolerance to statins.  Her coronary calcium score is 0 checked last year.  We will check her lipids today her target LDL should be less than 130 and has been running higher.  - REVIEW OF HEALTH MAINTENANCE PROTOCOL ORDERS  - Lipid panel reflex to direct LDL Fasting  - TSH  - CK total  - Physical Therapy Referral  - Lipid panel reflex to direct LDL Fasting  - TSH  - CK total    Bilateral thigh pain  She has no evidence of radiculopathy on exam she has some varicosities noted.  Without signs of venous stasis disease like ulcerations, superficial thrombophlebitis or skin changes or pedal edema.  We will check her CPK.  She has good peripheral arterial circulation pulses.  I did reassure the is very unlikely she has a circulation problem due to the blood vessels other than her venous insufficiency compression stockings and walking is actually helpful for her.  - Physical Therapy Referral    Hip pain, left  Her full hip rotation is preserved.  Last x-ray showed mild to moderate arthritis reportedly by patient.  Since she does not have intractable pain or limitation in gait she can get physical therapy.  I did tell her it is okay to take ibuprofen periodically.  She can then follow-up with [facility].  She is averse to taking steroid shots.  - Physical Therapy Referral    Elevated BP without diagnosis of hypertension  We had a long discussion on her blood pressure her average blood pressure is excellent the wrist blood pressure meter is not accurate.  Given her concern that the lability in the occasional elevated other clinic visits we will start her on very low-dose atenolol 12.5 mg.  She will monitor her blood pressure and heart rate and have nurse only visit at the clinic.  She has a strong family history of ischemic heart disease and this will serve to be cardioprotective.  She was warned  of any bradycardia that might result  - atenolol (TENORMIN) 25 MG tablet  Dispense: 34 tablet; Refill: 3  - Physical Therapy Referral    Hypothyroidism due to Hashimoto's thyroiditis    - levothyroxine (SYNTHROID/LEVOTHROID) 50 MCG tablet  Dispense: 90 tablet; Refill: 3  - Physical Therapy Referral    Healthcare maintenance  Reviewed and up-to-date she is due for her Pneumo 23 today    Pre-diabetes    - Hemoglobin A1c  - Hemoglobin A1c               Next scheduled visit    No follow-ups on file.    Palmira Wray MD  [facility] MIDWJEAN Cheng is a 66 year old who presents for the following health issues very pleasant patient with a history of hypothyroidism and hyperlipidemia and strong family history of ischemic heart disease has a couple of complaints    1.Thought had pink eye went to the eye doctor and had a higher blood pressure at 150 x 90.  At other clinic visits blood pressures been up home readings have been fluctuating with average blood pressures below 130 x 80.  She is very concerned about this though and wants to be proactive   second complaint is bilateral thigh pain   2. had similar issue with tamoxifen and statin , now has had some leg pain on and off  Thi 4 months is different , and has gottne progressive , feels like a circualtory thing )  If she elevated her legs it diminishes quickly it is not made worse by walking.  Had EMG in the past was normal     HPI     Patient Active Problem List   Diagnosis     Hyperlipidemia     Postmenopausal     Breast cancer (H)     Healthcare maintenance     Current Outpatient Medications   Medication     aspirin 81 MG EC tablet          calcium carbonate-vitamin D2 500 mg(1,250mg) -200 unit tablet     cholecalciferol, vitamin D3, (VITAMIN D3) 2,000 unit cap     levothyroxine (SYNTHROID/LEVOTHROID) 50 MCG tablet     multivitamin therapeutic (THERAGRAN) tablet     rosuvastatin (CRESTOR) 5 MG tablet     Saccharomyces boulardii  "(FLORASTOR) 250 mg capsule     No current facility-administered medications for this visit.           Review of Systems   Constitutional, HEENT, cardiovascular, pulmonary, gi and gu systems are negative, except as otherwise noted.      Objective    /80 (BP Location: Right arm, Patient Position: Sitting, Cuff Size: Adult Small)   Pulse 84   Ht 1.664 m (5' 5.5\")   Wt 58.1 kg (128 lb)   SpO2 96%   BMI 20.98 kg/m    Body mass index is 20.98 kg/m .  Physical Exam   GENERAL: healthy, alert and no distress  NECK: no adenopathy, no asymmetry, masses, or scars and thyroid normal to palpation  RESP: lungs clear to auscultation - no rales, rhonchi or wheezes  CV: regular rate and rhythm, normal S1 S2, no S3 or S4, no murmur, click or rub, no peripheral edema and peripheral pulses strong  ABDOMEN: soft, nontender, no hepatosplenomegaly, no masses and bowel sounds normal  MS: no gross musculoskeletal defects noted, no edema  Straight leg raising is negative she is symmetrical power and reflexes of the lower extremities she has good peripheral pulses and popliteal pulses.  She has no pedal edema spine reveals extending up to thigh without any edema calf is supple.  She has no signs of venous stasis issues on her skin   Monofilament testing is normal in her feet  {   Answers for HPI/ROS submitted by the patient on 11/23/2021  Are you regularly taking any medication or supplement to lower your cholesterol?: Yes  Are you having muscle aches or other side effects that you think could be caused by your cholesterol lowering medication?: No  Do you check your blood pressure regularly outside of the clinic?: No  Are your blood pressures ever more than 140 on the top number (systolic) OR more than 90 on the bottom number (diastolic)? (For example, greater than 140/90): Yes  Are you following a low salt diet?: Yes  Nitroglycerin use:: never  Do you take an aspirin every day?: Yes  How many servings of fruits and vegetables do " you eat daily?: 2-3  On average, how many sweetened beverages do you drink each day (Examples: soda, juice, sweet tea, etc.  Do NOT count diet or artificially sweetened beverages)?: 1  How many minutes a day do you exercise enough to make your heart beat faster?: 10 to 19  How many days a week do you exercise enough to make your heart beat faster?: 3 or less  How many days per week do you miss taking your medication?: 0

## 2021-12-13 ENCOUNTER — HOSPITAL ENCOUNTER (OUTPATIENT)
Dept: PHYSICAL THERAPY | Facility: REHABILITATION | Age: 66
End: 2021-12-13
Attending: INTERNAL MEDICINE
Payer: COMMERCIAL

## 2021-12-13 DIAGNOSIS — M53.3 SI (SACROILIAC) JOINT DYSFUNCTION: ICD-10-CM

## 2021-12-13 DIAGNOSIS — M79.651 BILATERAL THIGH PAIN: ICD-10-CM

## 2021-12-13 DIAGNOSIS — E78.2 MIXED HYPERLIPIDEMIA: ICD-10-CM

## 2021-12-13 DIAGNOSIS — M79.652 BILATERAL THIGH PAIN: ICD-10-CM

## 2021-12-13 DIAGNOSIS — M25.552 HIP PAIN, LEFT: Primary | ICD-10-CM

## 2021-12-13 DIAGNOSIS — E06.3 HYPOTHYROIDISM DUE TO HASHIMOTO'S THYROIDITIS: ICD-10-CM

## 2021-12-13 DIAGNOSIS — R03.0 ELEVATED BP WITHOUT DIAGNOSIS OF HYPERTENSION: ICD-10-CM

## 2021-12-13 PROCEDURE — 97140 MANUAL THERAPY 1/> REGIONS: CPT | Mod: GP | Performed by: PHYSICAL THERAPIST

## 2021-12-13 PROCEDURE — 97161 PT EVAL LOW COMPLEX 20 MIN: CPT | Mod: GP | Performed by: PHYSICAL THERAPIST

## 2021-12-13 PROCEDURE — 97110 THERAPEUTIC EXERCISES: CPT | Mod: GP | Performed by: PHYSICAL THERAPIST

## 2021-12-14 NOTE — PROGRESS NOTES
[facility]    OUTPATIENT PHYSICAL THERAPY ORTHOPEDIC EVALUATION  PLAN OF TREATMENT FOR OUTPATIENT REHABILITATION  (COMPLETE FOR INITIAL CLAIMS ONLY)  Patient's Last Name, First Name, M.I.  YOB: 1955  JassonSkylar  A    Provider s Name:  [facility]   Medical Record No.  [id number]   Start of Care Date:  12/13/21   Onset Date:  11/29/21   Type:     _X__PT   ___OT   ___SLP Medical Diagnosis:  (P) E78.2 (ICD-10-CM) - Mixed hyperlipidemia; M79.651, M79.652 (ICD-10-CM) - Bilateral thigh pain; M25.552 (ICD-10-CM) - Hip pain, left; R03.0 (ICD-10-CM) - Elevated BP without diagnosis of hypertension; E03.8, E06.3 (ICD-10-CM) - Hypothyroidism due to Hashimoto's thyroiditis     PT Diagnosis:  SI joint dysfunction   Visits from SOC:  1      _________________________________________________________________________________  Plan of Treatment/Functional Goals:  joint mobilization,manual therapy,ROM,strengthening,stretching           Goals  Goal Identifier: Self-management/HEP  Goal Description: Patient will be independent in self-management of condition and HEP  Target Date: 02/21/22    Goal Identifier: Maneuver stairs  Goal Description: Patient will be able to maneuver up and down stairs without pain or difficulty in order to watch her granddaughter.  Target Date: 02/21/22    Goal Identifier: Walking  Goal Description: Patient will be able to walk 2 miles without pain or limping for returning to PLOF.  Target Date: 02/21/22    Goal Identifier: Morning pain  Goal Description: Patient will be able to get out of bed in the morning without pain for improved QOL.  Target Date: 02/21/22       Therapy Frequency:  other (see comments) (1x/week to every other week)  Predicted Duration of Therapy Intervention:  6-12 weeks    Kamille Spears, PT, DPT, MHA                 I CERTIFY THE  NEED FOR THESE SERVICES FURNISHED UNDER        THIS PLAN OF TREATMENT AND WHILE UNDER MY CARE     (Physician co-signature of this document indicates review and certification of the therapy plan).                       Certification Date From:  (P) 12/13/21   Certification Date To:  (P) 03/12/22    Referring Provider:  Palmira Wray MD    Initial Assessment        See Epic Evaluation Start of Care Date: 12/13/21 12/13/21 0900   General Information   Type of Visit Initial OP Ortho PT Evaluation   Start of Care Date 12/13/21   Referring Physician Palmira Wray MD   Patient/Family Goals Statement Help limping/compensating left hip and it goes into legs   Orders Evaluate and Treat   Date of Order 11/29/21   Certification Required? Yes   Medical Diagnosis E78.2 (ICD-10-CM) - Mixed hyperlipidemia; M79.651, M79.652 (ICD-10-CM) - Bilateral thigh pain; M25.552 (ICD-10-CM) - Hip pain, left; R03.0 (ICD-10-CM) - Elevated BP without diagnosis of hypertension; E03.8, E06.3 (ICD-10-CM) - Hypothyroidism due to Hashimoto's thyroiditis   Surgical/Medical history reviewed Yes   Precautions/Limitations no known precautions/limitations       Present No   Body Part(s)   Body Part(s) Lumbar Spine/SI   Presentation and Etiology   Pertinent history of current problem (include personal factors and/or comorbidities that impact the POC) The patient reports that 10 years ago, she did tear a muscle in her L hip.  2 years ago, she started noticing pain in her L buttock and went to [facility] and did 3 sessions of PT.  This was 1 year ago.  Her pain is like a resurgence of what she felt a year ago.  It is still in the buttock, but feels more hip related.  She is starting to compensate and has also been limping, noticed by her  more that her.  There are some pains in her lower legs, but she is not sure if it is related to her hip pain.  Lifting can be challenging and can test her body.  Lifting can be hard.      Impairments A. Pain;H. Impaired gait   Functional Limitations perform activities of daily living;perform desired leisure / sports activities   Symptom Location Left buttock/hip and leg   How/Where did it occur From insidious onset   Onset date of current episode/exacerbation 11/29/21   Chronicity Chronic   Pain rating (0-10 point scale) Best (/10);Worst (/10)   Best (/10) 0   Worst (/10) 4   Pain quality B. Dull;C. Aching   Frequency of pain/symptoms C. With activity   Pain/symptoms are: The same all the time   Pain/symptoms exacerbated by B. Walking;C. Lifting;K. Home tasks;M. Other   Pain exacerbation comment maneuvering stairs   Pain/symptoms eased by C. Rest;K. Other   Pain eased by comment elevating legs   Progression of symptoms since onset: Improved   Current Level of Function   Patient role/employment history F. Retired   [facility]   Fall Risk Screen   Fall screen completed by PT   Have you fallen 2 or more times in the past year? No   Have you fallen and had an injury in the past year? No   Is patient a fall risk? No   Abuse Screen (yes response referral indicated)   Feels Unsafe at Home or Work/School no   Feels Threatened by Someone no   Does Anyone Try to Keep You From Having Contact with Others or Doing Things Outside Your Home? no   Physical Signs of Abuse Present no   System Outcome Measures   Outcome Measures   (LEFS: 67/80)   Lumbar Spine/SI Objective Findings   Observation Very slight lean to the R   Posture Slight decreased lumbar lordosis   Flexion ROM To mid shin   Extension ROM Min limited   Right Side Bending ROM Pulling L   Left Side Bending ROM WNL   Pelvic Screen L LE appears longer supine; L ASIS higher supine   Hip Screen Decreased L hip ROM into ER; positive scour R   Hip Abduction Strength 4   Lumbar/Hip/Knee/Foot Strength Comments Good functional strength   Lumbar/SI Special Tests Comments PATRICIA + L pain in buttock; thigh thrust positive L   Segmental  Mobility Hypomobility; pain L facet joints L2-L5   Palpation Tenderness bilateral ITB L > R; tenderness bilateral buttock L > R; tenderness L hip rotator musculature   Planned Therapy Interventions   Planned Therapy Interventions joint mobilization;manual therapy;ROM;strengthening;stretching   Clinical Impression   Criteria for Skilled Therapeutic Interventions Met yes, treatment indicated   PT Diagnosis SI joint dysfunction   Influenced by the following impairments Impaired ROM, strength, posture, and gait   Functional limitations due to impairments Maneuvering stairs, walking long distances, getting out of bed in the morning   Clinical Presentation Stable/Uncomplicated   Clinical Decision Making (Complexity) Low complexity   Therapy Frequency other (see comments)  (1x/week to every other week)   Predicted Duration of Therapy Intervention (days/wks) 6-12 weeks   Risk & Benefits of therapy have been explained Yes   Patient, Family & other staff in agreement with plan of care Yes   Clinical Impression Comments Skylar Spaulding is a 66 year old female who presents to PT with c/o L hip and leg pain.  Initial onset of symptoms was 2 years ago with improvements after doing PT 1 year ago, and worsening symptoms in the last few weeks.  She has PMH positive for breast cancer, hyperlipidemia, and OA.  Her pain symptoms are dull and achy in nature, and rated 0-4/10.  She is currently limited in maneuvering stairs, walking long distances without limping, and getting out of bed in the morning without pain.  On exam, patient demonstrates impairments in hip ROM, had positive SI joint provocation tests, impaired strength, gait, and posture.  She demonstrates symptoms most consistent with SI joint dysfunction secondary to hip muscle weakness and likely lumbar facet arthropathy.  She is appropriate for skilled 1:1 PT services to address the listed impairments and return to function.   ORTHO GOALS   PT Ortho Eval Goals 1;2;3;4   Ortho  Goal 1   Goal Identifier Self-management/HEP   Goal Description Patient will be independent in self-management of condition and HEP   Target Date 02/21/22   Ortho Goal 2   Goal Identifier Maneuver stairs   Goal Description Patient will be able to maneuver up and down stairs without pain or difficulty in order to watch her granddaughter.   Target Date 02/21/22   Ortho Goal 3   Goal Identifier Walking   Goal Description Patient will be able to walk 2 miles without pain or limping for returning to OF.   Target Date 02/21/22   Ortho Goal 4   Goal Identifier Morning pain   Goal Description Patient will be able to get out of bed in the morning without pain for improved QOL.   Target Date 02/21/22   Total Evaluation Time   PT Eval, Low Complexity Minutes (36638) 29   Therapy Certification   Certification date from 12/13/21   Certification date to 03/12/22   Medical Diagnosis E78.2 (ICD-10-CM) - Mixed hyperlipidemia; M79.651, M79.652 (ICD-10-CM) - Bilateral thigh pain; M25.552 (ICD-10-CM) - Hip pain, left; R03.0 (ICD-10-CM) - Elevated BP without diagnosis of hypertension; E03.8, E06.3 (ICD-10-CM) - Hypothyroidism due to Hashimoto's thyroiditis     Kamille Spears, PT, DPT, MHA  12/13/2021

## 2021-12-21 NOTE — ADDENDUM NOTE
Encounter addended by: Kamille Spears, PT on: 12/21/2021 2:41 PM   Actions taken: Clinical Note Signed

## 2021-12-31 ENCOUNTER — HOSPITAL ENCOUNTER (OUTPATIENT)
Dept: PHYSICAL THERAPY | Facility: REHABILITATION | Age: 66
End: 2021-12-31
Payer: COMMERCIAL

## 2021-12-31 DIAGNOSIS — M25.552 HIP PAIN, LEFT: Primary | ICD-10-CM

## 2021-12-31 DIAGNOSIS — M79.651 BILATERAL THIGH PAIN: ICD-10-CM

## 2021-12-31 DIAGNOSIS — M79.652 BILATERAL THIGH PAIN: ICD-10-CM

## 2021-12-31 DIAGNOSIS — M53.3 SI (SACROILIAC) JOINT DYSFUNCTION: ICD-10-CM

## 2021-12-31 PROCEDURE — 97140 MANUAL THERAPY 1/> REGIONS: CPT | Mod: GP | Performed by: PHYSICAL THERAPIST

## 2021-12-31 PROCEDURE — 97110 THERAPEUTIC EXERCISES: CPT | Mod: GP | Performed by: PHYSICAL THERAPIST

## 2022-01-03 ENCOUNTER — HOSPITAL ENCOUNTER (OUTPATIENT)
Dept: PHYSICAL THERAPY | Facility: REHABILITATION | Age: 67
End: 2022-01-03
Payer: COMMERCIAL

## 2022-01-03 DIAGNOSIS — M79.651 BILATERAL THIGH PAIN: ICD-10-CM

## 2022-01-03 DIAGNOSIS — M53.3 SI (SACROILIAC) JOINT DYSFUNCTION: ICD-10-CM

## 2022-01-03 DIAGNOSIS — M25.552 HIP PAIN, LEFT: Primary | ICD-10-CM

## 2022-01-03 DIAGNOSIS — M79.652 BILATERAL THIGH PAIN: ICD-10-CM

## 2022-01-03 PROCEDURE — 97140 MANUAL THERAPY 1/> REGIONS: CPT | Mod: GP | Performed by: PHYSICAL THERAPIST

## 2022-01-03 PROCEDURE — 97110 THERAPEUTIC EXERCISES: CPT | Mod: GP | Performed by: PHYSICAL THERAPIST

## 2022-01-07 NOTE — ADDENDUM NOTE
Encounter addended by: Kamille Spears, PT on: 1/7/2022 11:53 AM   Actions taken: Clinical Note Signed

## 2022-01-28 ENCOUNTER — HOSPITAL ENCOUNTER (OUTPATIENT)
Dept: PHYSICAL THERAPY | Facility: REHABILITATION | Age: 67
End: 2022-01-28
Payer: COMMERCIAL

## 2022-01-28 DIAGNOSIS — M79.652 BILATERAL THIGH PAIN: ICD-10-CM

## 2022-01-28 DIAGNOSIS — M25.552 HIP PAIN, LEFT: Primary | ICD-10-CM

## 2022-01-28 DIAGNOSIS — M53.3 SI (SACROILIAC) JOINT DYSFUNCTION: ICD-10-CM

## 2022-01-28 DIAGNOSIS — M79.651 BILATERAL THIGH PAIN: ICD-10-CM

## 2022-01-28 PROCEDURE — 97110 THERAPEUTIC EXERCISES: CPT | Mod: GP | Performed by: PHYSICAL THERAPIST

## 2022-01-28 PROCEDURE — 97140 MANUAL THERAPY 1/> REGIONS: CPT | Mod: GP | Performed by: PHYSICAL THERAPIST

## 2022-02-01 ENCOUNTER — TRANSFERRED RECORDS (OUTPATIENT)
Dept: HEALTH INFORMATION MANAGEMENT | Facility: CLINIC | Age: 67
End: 2022-02-01

## 2022-02-01 ENCOUNTER — OFFICE VISIT (OUTPATIENT)
Dept: INTERNAL MEDICINE | Facility: CLINIC | Age: 67
End: 2022-02-01
Payer: COMMERCIAL

## 2022-02-01 VITALS
HEART RATE: 79 BPM | BODY MASS INDEX: 20.32 KG/M2 | SYSTOLIC BLOOD PRESSURE: 130 MMHG | OXYGEN SATURATION: 98 % | WEIGHT: 124 LBS | DIASTOLIC BLOOD PRESSURE: 82 MMHG

## 2022-02-01 DIAGNOSIS — Z17.1 MALIGNANT NEOPLASM OF LEFT BREAST IN FEMALE, ESTROGEN RECEPTOR NEGATIVE, UNSPECIFIED SITE OF BREAST (H): ICD-10-CM

## 2022-02-01 DIAGNOSIS — Z11.4 SCREENING FOR HIV (HUMAN IMMUNODEFICIENCY VIRUS): ICD-10-CM

## 2022-02-01 DIAGNOSIS — Z00.00 HEALTH MAINTENANCE EXAMINATION: ICD-10-CM

## 2022-02-01 DIAGNOSIS — E78.2 MIXED HYPERLIPIDEMIA: ICD-10-CM

## 2022-02-01 DIAGNOSIS — U07.1 INFECTION DUE TO 2019 NOVEL CORONAVIRUS: ICD-10-CM

## 2022-02-01 DIAGNOSIS — C50.912 MALIGNANT NEOPLASM OF LEFT BREAST IN FEMALE, ESTROGEN RECEPTOR NEGATIVE, UNSPECIFIED SITE OF BREAST (H): ICD-10-CM

## 2022-02-01 DIAGNOSIS — Z00.00 WELCOME TO MEDICARE PREVENTIVE VISIT: Primary | ICD-10-CM

## 2022-02-01 LAB
ALBUMIN SERPL-MCNC: 4 G/DL (ref 3.5–5)
ALP SERPL-CCNC: 60 U/L (ref 45–120)
ALT SERPL W P-5'-P-CCNC: 14 U/L (ref 0–45)
ANION GAP SERPL CALCULATED.3IONS-SCNC: 10 MMOL/L (ref 5–18)
AST SERPL W P-5'-P-CCNC: 17 U/L (ref 0–40)
BILIRUB SERPL-MCNC: 0.5 MG/DL (ref 0–1)
BUN SERPL-MCNC: 10 MG/DL (ref 8–22)
CALCIUM SERPL-MCNC: 9.8 MG/DL (ref 8.5–10.5)
CHLORIDE BLD-SCNC: 105 MMOL/L (ref 98–107)
CHOLEST SERPL-MCNC: 204 MG/DL
CO2 SERPL-SCNC: 25 MMOL/L (ref 22–31)
CREAT SERPL-MCNC: 0.8 MG/DL (ref 0.6–1.1)
ERYTHROCYTE [DISTWIDTH] IN BLOOD BY AUTOMATED COUNT: 12.5 % (ref 10–15)
FASTING STATUS PATIENT QL REPORTED: YES
GFR SERPL CREATININE-BSD FRML MDRD: 81 ML/MIN/1.73M2
GLUCOSE BLD-MCNC: 92 MG/DL (ref 70–125)
HCT VFR BLD AUTO: 40.1 % (ref 35–47)
HDLC SERPL-MCNC: 62 MG/DL
HGB BLD-MCNC: 12.7 G/DL (ref 11.7–15.7)
HIV 1+2 AB+HIV1 P24 AG SERPL QL IA: NEGATIVE
LDLC SERPL CALC-MCNC: 120 MG/DL
MCH RBC QN AUTO: 29.3 PG (ref 26.5–33)
MCHC RBC AUTO-ENTMCNC: 31.7 G/DL (ref 31.5–36.5)
MCV RBC AUTO: 92 FL (ref 78–100)
PLATELET # BLD AUTO: 271 10E3/UL (ref 150–450)
POTASSIUM BLD-SCNC: 3.6 MMOL/L (ref 3.5–5)
PROT SERPL-MCNC: 7.2 G/DL (ref 6–8)
RBC # BLD AUTO: 4.34 10E6/UL (ref 3.8–5.2)
SODIUM SERPL-SCNC: 140 MMOL/L (ref 136–145)
TRIGL SERPL-MCNC: 112 MG/DL
WBC # BLD AUTO: 4.9 10E3/UL (ref 4–11)

## 2022-02-01 PROCEDURE — 36415 COLL VENOUS BLD VENIPUNCTURE: CPT | Performed by: INTERNAL MEDICINE

## 2022-02-01 PROCEDURE — 87389 HIV-1 AG W/HIV-1&-2 AB AG IA: CPT | Performed by: INTERNAL MEDICINE

## 2022-02-01 PROCEDURE — G0404 EKG TRACING FOR INITIAL PREV: HCPCS | Performed by: INTERNAL MEDICINE

## 2022-02-01 PROCEDURE — 85027 COMPLETE CBC AUTOMATED: CPT | Performed by: INTERNAL MEDICINE

## 2022-02-01 PROCEDURE — 80061 LIPID PANEL: CPT | Performed by: INTERNAL MEDICINE

## 2022-02-01 PROCEDURE — G0402 INITIAL PREVENTIVE EXAM: HCPCS | Performed by: INTERNAL MEDICINE

## 2022-02-01 PROCEDURE — 80053 COMPREHEN METABOLIC PANEL: CPT | Performed by: INTERNAL MEDICINE

## 2022-02-01 PROCEDURE — G0405 EKG INTERPRET & REPORT PREVE: HCPCS | Performed by: INTERNAL MEDICINE

## 2022-02-01 ASSESSMENT — ACTIVITIES OF DAILY LIVING (ADL): CURRENT_FUNCTION: NO ASSISTANCE NEEDED

## 2022-02-01 NOTE — PROGRESS NOTES
"Answers for HPI/ROS submitted by the patient on 2/1/2022  In general, how would you rate your overall physical health?: excellent  Frequency of exercise:: 2-3 days/week  Do you usually eat at least 4 servings of fruit and vegetables a day, include whole grains & fiber, and avoid regularly eating high fat or \"junk\" foods? : Yes  Taking medications regularly:: Yes  Medication side effects:: None  Activities of Daily Living: no assistance needed  Home safety: no safety concerns identified  Hearing Impairment:: no hearing concerns  In the past 6 months, have you been bothered by leaking of urine?: No  In general, how would you rate your overall mental or emotional health?: excellent  Additional concerns today:: No  Duration of exercise:: Less than 15 minutes    SUBJECTIVE:   Skylar Spaulding is a 66 year old very pleasant female who presents for Preventive Visit.welcome to medicare   She is a breast cancer survivor and has hyperlipidemia and hypothyroidism . She is feeling well very active .   Vision december 2021   Dr borden       Patient has been advised of split billing requirements and indicates understanding: Yes  Are you in the first 12 months of your Medicare coverage?  No    Healthy Habits:     In general, how would you rate your overall health?  Excellent    Frequency of exercise:  2-3 days/week    Duration of exercise:  Less than 15 minutes    Do you usually eat at least 4 servings of fruit and vegetables a day, include whole grains    & fiber and avoid regularly eating high fat or \"junk\" foods?  Yes    Taking medications regularly:  Yes    Medication side effects:  None    Ability to successfully perform activities of daily living:  No assistance needed    Home Safety:  No safety concerns identified    Hearing Impairment:  No hearing concerns    In the past 6 months, have you been bothered by leaking of urine?  No    In general, how would you rate your overall mental or emotional health?  Excellent      PHQ-2 " Total Score: 0    Additional concerns today:  No    Do you feel safe in your environment? Yes    Have you ever done Advance Care Planning? (For example, a Health Directive, POLST, or a discussion with a medical provider or your loved ones about your wishes): Yes, advance care planning is on file.       Fall risk      Cognitive Screening   1) Repeat 3 items (Leader, Season, Table)    2) Clock draw:   3) 3 item recall: Recalls 3 objects  Results: NORMAL clock, 1-2 items recalled: COGNITIVE IMPAIRMENT LESS LIKELY    mammo 12/2021     Mini-CogTM Copyright S Mike. Licensed by the author for use in [facility]; reprinted with permission (soob@[facility]). All rights reserved.      Do you have sleep apnea, excessive snoring or daytime drowsiness?: no    Reviewed and updated as needed this visit by clinical staff  Tobacco  Allergies  Meds   Med Hx  Surg Hx  Fam Hx  Soc Hx       Reviewed and updated as needed this visit by Provider               Social History     Tobacco Use     Smoking status: Never Smoker     Smokeless tobacco: Never Used   Substance Use Topics     Alcohol use: No         Alcohol Use 2/1/2022   Prescreen: >3 drinks/day or >7 drinks/week? Not Applicable               Current providers sharing in care for this patient include:   Patient Care Team:  Palmira Wray MD as PCP - General (Internal Medicine)  Palmira Wray MD as Assigned PCP    The following health maintenance items are reviewed in Epic and correct as of today:  Health Maintenance Due   Topic Date Due     ADVANCE CARE PLANNING  Never done     HIV SCREENING  Never done     Patient Active Problem List   Diagnosis     Hyperlipidemia     Postmenopausal     Breast cancer (H)     Health maintenance examination     Infection due to 2019 novel coronavirus     Current Outpatient Medications   Medication     aspirin 81 MG EC tablet     calcium carbonate-vitamin D2 500 mg(1,250mg) -200 unit tablet     cholecalciferol, vitamin D3,  "(VITAMIN D3) 2,000 unit cap     levothyroxine (SYNTHROID/LEVOTHROID) 50 MCG tablet     multivitamin therapeutic (THERAGRAN) tablet     rosuvastatin (CRESTOR) 5 MG tablet     Saccharomyces boulardii (FLORASTOR) 250 mg capsule     No current facility-administered medications for this visit.           FHS-7:   Breast CA Risk Assessment (FHS-7) 2/1/2022   Did any of your first-degree relatives have breast or ovarian cancer? Yes   Did any of your relatives have bilateral breast cancer? No   Did any man in your family have breast cancer? No   Did any woman in your family have breast and ovarian cancer? Yes   Did any woman in your family have breast cancer before age 50 y? No   Do you have 2 or more relatives with breast and/or ovarian cancer? No   Do you have 2 or more relatives with breast and/or bowel cancer? No         Pertinent mammograms are reviewed under the imaging tab.    Review of Systems  no shortness of breath ,no  chest pain ,no  Falls, no urinary incontinence , no weight changes , sleep and moodhave been good . Independent in ADLS and IADLS       OBJECTIVE:   /82 (BP Location: Left arm, Patient Position: Sitting, Cuff Size: Adult Large)   Pulse 79   Wt 56.2 kg (124 lb)   SpO2 98%   BMI 20.32 kg/m   Estimated body mass index is 20.32 kg/m  as calculated from the following:    Height as of 11/29/21: 1.664 m (5' 5.5\").    Weight as of this encounter: 56.2 kg (124 lb).  Physical Exam  GENERAL: healthy, alert and no distress  EYES: Eyes grossly normal to inspection, PERRL and conjunctivae and sclerae normal  HENT: ear canals and TM's normal, nose and mouth without ulcers or lesions  NECK: no adenopathy, no asymmetry, masses, or scars and thyroid normal to palpation  RESP: lungs clear to auscultation - no rales, rhonchi or wheezes  BREAST: normal without masses, tenderness or nipple discharge and no palpable axillary masses or adenopathy  CV: regular rate and rhythm, normal S1 S2, no S3 or S4, no murmur, " click or rub, no peripheral edema and peripheral pulses strong  ABDOMEN: soft, nontender, no hepatosplenomegaly, no masses and bowel sounds normal  MS: no gross musculoskeletal defects noted, no edema  SKIN: no suspicious lesions or rashes  NEURO: Normal strength and tone, mentation intact and speech normal  PSYCH: mentation appears normal, affect normal/bright  he 10-year ASCVD risk score (Joan VILLEDA Jr., et al., 2013) is: 5.4%    Values used to calculate the score:      Age: 66 years      Sex: Female      Is Non- : No      Diabetic: No      Tobacco smoker: No      Systolic Blood Pressure: 130 mmHg      Is BP treated: No      HDL Cholesterol: 72 mg/dL      Total Cholesterol: 221 mg/dL    Six Item Cognitive Impairment Test   (6CIT):      What year is it?                               Correct - 0 points    What month is it?                               Correct - 0 points      Give the patient an address to remember with five components:   [place] ( first and last name - 2 components)   [address]  (number and name of street - 2 components)   [place] ( city - 1 component)      About what time is it (within the hour)? Correct - 0 points    Count backwards from 20 to 1:   Correct - 0 points    Say the months of the year in reverse: Correct - 0 points    Repeat the address phrase:   Correct - 0 points    Total 6CIT Score:      0/28  Family history .some cognitive issues    family h/o depression  Has no mental health issue   Interpretation: The 6CIT uses an inverse score and questions are weighted to produce a total out of 28. Scores of 0-7 are considered normal and 8 or more significant.    Advantages The test has high sensitivity without compromising specificity even in mild dementia. It is easy to translate linguistically and culturally.  Disadvantages The main disadvantage is in the scoring and weighting of the test, which is initially confusing, however computer models have simplified  "this greatly.    Probability Statistics: At the 7/8 cut off: Overall figures sensitivity 90% specificity 100%, in mild dementia sensitivity = 78% , specificity = 100%    Copyright 2000 [facility], [facility], . Courtesy of Dr. Esau Archuleta      Diagnostic Test Results:  Labs reviewed in Epic    ASSESSMENT / PLAN:   (Z00.00) Welcome to Medicare preventive visit  (primary encounter diagnosis)  Comment: EKG NSR NAD   Plan: EKG 12-lead, tracing only            (Z11.4) Screening for HIV (human immunodeficiency virus)  Comment:   Plan: HIV Antigen Antibody Combo            (C50.912,  Z17.1) Malignant neoplasm of left breast in female, estrogen receptor negative, unspecified site of breast (H)  Comment: in remission going to [facility] breast center forr annual mammograms   Plan:     (E78.2) Mixed hyperlipidemia  Comment:   Plan: Comprehensive metabolic panel, CBC with         platelets, Lipid panel reflex to direct LDL         Fasting            (Z00.00) Health maintenance examination  Comment: mammo annual ( [facility] )   Colon has a tortuous colon and was told not to get it done again. Then had   colon enterography and then virtual colonoscopy 2020  ( had a horrible experience will do cologard in 2025   dexa 2018, 2021   Pap smear    pap done by gyn       Plan:     (U07.1) Infection due to 2019 novel coronavirus  Comment: recovered uneventfully   Plan:     overall excellent health and insight     COUNSELING:  Reviewed preventive health counseling, as reflected in patient instructions  Coronary caclium is zero    PT referral helped ,   Leg pain ,   immiunization complet e    Estimated body mass index is 20.32 kg/m  as calculated from the following:    Height as of 11/29/21: 1.664 m (5' 5.5\").    Weight as of this encounter: 56.2 kg (124 lb).        She reports that she has never smoked. She has never used smokeless tobacco.  TAppropriate preventive services were discussed with this patient, " including applicable screening as appropriate for cardiovascular disease, diabetes, osteopenia/osteoporosis, and glaucoma.  As appropriate for age/gender, discussed screening for colorectal cancer, prostate cancer, breast cancer, and cervical cancer. Checklist reviewing preventive services available has been given to the patient.    Reviewed patients plan of care and provided an AVS. The Basic Care Plan (routine screening as documented in Health Maintenance) for Skylar meets the Care Plan requirement. This Care Plan has been established and reviewed with the Patient.    Counseling Resources:  ATP IV Guidelines  Pooled Cohorts Equation Calculator  Breast Cancer Risk Calculator  Breast Cancer: Medication to Reduce Risk  FRAX Risk Assessment  ICSI Preventive Guidelines  Dietary Guidelines for Americans, 2010  [facility]'s MyPlate  ASA Prophylaxis  Lung CA Screening    Palmira Wray MD  [facility]    Identified Health Risks:

## 2022-02-02 ENCOUNTER — TELEPHONE (OUTPATIENT)
Dept: INTERNAL MEDICINE | Facility: CLINIC | Age: 67
End: 2022-02-02
Payer: COMMERCIAL

## 2022-02-02 DIAGNOSIS — E06.3 HYPOTHYROIDISM DUE TO HASHIMOTO'S THYROIDITIS: ICD-10-CM

## 2022-02-02 DIAGNOSIS — E78.5 HYPERLIPIDEMIA, UNSPECIFIED HYPERLIPIDEMIA TYPE: ICD-10-CM

## 2022-02-02 LAB
ATRIAL RATE - MUSE: 62 BPM
DIASTOLIC BLOOD PRESSURE - MUSE: NORMAL MMHG
INTERPRETATION ECG - MUSE: NORMAL
P AXIS - MUSE: 73 DEGREES
PR INTERVAL - MUSE: 158 MS
QRS DURATION - MUSE: 70 MS
QT - MUSE: 402 MS
QTC - MUSE: 408 MS
R AXIS - MUSE: 73 DEGREES
SYSTOLIC BLOOD PRESSURE - MUSE: NORMAL MMHG
T AXIS - MUSE: 47 DEGREES
VENTRICULAR RATE- MUSE: 62 BPM

## 2022-02-02 RX ORDER — LEVOTHYROXINE SODIUM 50 UG/1
TABLET ORAL
Qty: 90 TABLET | Refills: 3 | Status: SHIPPED | OUTPATIENT
Start: 2022-02-02 | End: 2022-11-15

## 2022-02-02 RX ORDER — ROSUVASTATIN CALCIUM 5 MG/1
5 TABLET, COATED ORAL DAILY
Qty: 90 TABLET | Refills: 3 | Status: SHIPPED | OUTPATIENT
Start: 2022-02-02 | End: 2023-02-02

## 2022-02-02 NOTE — TELEPHONE ENCOUNTER
Printed letter in out box . Please verify pharamcy . I sent prescriptions to [facility] [place] on record

## 2022-02-02 NOTE — LETTER
2022      Skylar Spaulding  [address]  [place] [address]        To Whom It May Concern:    Skylar Spaulding   1955 is a patient under my care .   She has fully recovered from a Covid 19 infection in the first week of January . She is  fully vaccinated and per CDC guidelines, does not need to be in isolation. She also  tested negative on 2022 and 2022.                Sincerely,        Palmira Wray MD

## 2022-02-02 NOTE — TELEPHONE ENCOUNTER
Reason for Call:  Other call back    Detailed comments: Needing to do refills for:  Levothyroxine  Rosuvastatin    Pharm:  [facility]    Pt forgot to ask PCP is she could obtain a Documentation of Recovery letter - stating she has recovered from Covid and has had all vaccines -  NOTE:  Patient had Covid first week of January 2022 - tested negative on both 01/18/2022 and 01/25/2022    Please let patient know when this has been completed - she would like to pickup at the       Phone Number Patient can be reached at: Cell number on file:    Telephone Information:   Mobile [phone]       Best Time: anytime    Can we leave a detailed message on this number? YES    Call taken on 2/2/2022 at 3:09 PM by Liza Schroeder

## 2022-02-03 ENCOUNTER — TRANSFERRED RECORDS (OUTPATIENT)
Dept: HEALTH INFORMATION MANAGEMENT | Facility: CLINIC | Age: 67
End: 2022-02-03
Payer: COMMERCIAL

## 2022-02-04 ENCOUNTER — HOSPITAL ENCOUNTER (OUTPATIENT)
Dept: PHYSICAL THERAPY | Facility: REHABILITATION | Age: 67
End: 2022-02-04
Payer: COMMERCIAL

## 2022-02-04 DIAGNOSIS — M79.652 BILATERAL THIGH PAIN: ICD-10-CM

## 2022-02-04 DIAGNOSIS — M25.552 HIP PAIN, LEFT: Primary | ICD-10-CM

## 2022-02-04 DIAGNOSIS — M79.651 BILATERAL THIGH PAIN: ICD-10-CM

## 2022-02-04 DIAGNOSIS — M53.3 SI (SACROILIAC) JOINT DYSFUNCTION: ICD-10-CM

## 2022-02-04 PROCEDURE — 97110 THERAPEUTIC EXERCISES: CPT | Mod: GP | Performed by: PHYSICAL THERAPIST

## 2022-02-04 PROCEDURE — 97140 MANUAL THERAPY 1/> REGIONS: CPT | Mod: GP | Performed by: PHYSICAL THERAPIST

## 2022-02-11 ENCOUNTER — HOSPITAL ENCOUNTER (OUTPATIENT)
Dept: PHYSICAL THERAPY | Facility: REHABILITATION | Age: 67
End: 2022-02-11
Payer: COMMERCIAL

## 2022-02-11 DIAGNOSIS — M53.3 SI (SACROILIAC) JOINT DYSFUNCTION: ICD-10-CM

## 2022-02-11 DIAGNOSIS — M79.652 BILATERAL THIGH PAIN: ICD-10-CM

## 2022-02-11 DIAGNOSIS — M25.552 HIP PAIN, LEFT: Primary | ICD-10-CM

## 2022-02-11 DIAGNOSIS — M79.651 BILATERAL THIGH PAIN: ICD-10-CM

## 2022-02-11 PROCEDURE — 97110 THERAPEUTIC EXERCISES: CPT | Mod: GP | Performed by: PHYSICAL THERAPIST

## 2022-02-11 PROCEDURE — 97140 MANUAL THERAPY 1/> REGIONS: CPT | Mod: GP | Performed by: PHYSICAL THERAPIST

## 2022-04-11 ENCOUNTER — OFFICE VISIT (OUTPATIENT)
Dept: INTERNAL MEDICINE | Facility: CLINIC | Age: 67
End: 2022-04-11
Payer: COMMERCIAL

## 2022-04-11 ENCOUNTER — TELEPHONE (OUTPATIENT)
Dept: INTERNAL MEDICINE | Facility: CLINIC | Age: 67
End: 2022-04-11

## 2022-04-11 VITALS
TEMPERATURE: 97.3 F | SYSTOLIC BLOOD PRESSURE: 120 MMHG | WEIGHT: 131 LBS | RESPIRATION RATE: 16 BRPM | OXYGEN SATURATION: 98 % | DIASTOLIC BLOOD PRESSURE: 74 MMHG | HEART RATE: 65 BPM | HEIGHT: 65 IN | BODY MASS INDEX: 21.83 KG/M2

## 2022-04-11 DIAGNOSIS — Z17.1 MALIGNANT NEOPLASM OF LEFT BREAST IN FEMALE, ESTROGEN RECEPTOR NEGATIVE, UNSPECIFIED SITE OF BREAST (H): ICD-10-CM

## 2022-04-11 DIAGNOSIS — H57.12 EYE PAIN, LEFT: ICD-10-CM

## 2022-04-11 DIAGNOSIS — H53.9 VISUAL DISTURBANCE: Primary | ICD-10-CM

## 2022-04-11 DIAGNOSIS — R03.0 ELEVATED BP WITHOUT DIAGNOSIS OF HYPERTENSION: ICD-10-CM

## 2022-04-11 DIAGNOSIS — C50.912 MALIGNANT NEOPLASM OF LEFT BREAST IN FEMALE, ESTROGEN RECEPTOR NEGATIVE, UNSPECIFIED SITE OF BREAST (H): ICD-10-CM

## 2022-04-11 LAB
C REACTIVE PROTEIN LHE: 0.1 MG/DL (ref 0–0.8)
ERYTHROCYTE [SEDIMENTATION RATE] IN BLOOD BY WESTERGREN METHOD: 11 MM/HR (ref 0–20)

## 2022-04-11 PROCEDURE — 99214 OFFICE O/P EST MOD 30 MIN: CPT | Performed by: INTERNAL MEDICINE

## 2022-04-11 PROCEDURE — 86140 C-REACTIVE PROTEIN: CPT | Performed by: INTERNAL MEDICINE

## 2022-04-11 PROCEDURE — 85652 RBC SED RATE AUTOMATED: CPT | Performed by: INTERNAL MEDICINE

## 2022-04-11 PROCEDURE — 36415 COLL VENOUS BLD VENIPUNCTURE: CPT | Performed by: INTERNAL MEDICINE

## 2022-04-11 NOTE — PROGRESS NOTES
Office Visit - Follow Up   Skylar Spaulding   66 year old female    Date of Visit: 4/11/2022    Chief Complaint   Patient presents with     Eye Problem     Left eye pressure.achy - using advil prn along with refresh eye drops         Assessment and Plan   1. Visual disturbance  We will check inflammatory markers.  I do not think giant cell arteritis is likely but that would course be a potential.  Shingles is unlikely this is no rash.  Will check MRI brain for further evaluation.  She has an appointment with her primary care provider later this week which have urged her to can keep that appointment  - ESR: Erythrocyte sedimentation rate; Future  - CRP, inflammation; Future  - MR Brain w/o Contrast; Future    2. Eye pain, left  As above  - ESR: Erythrocyte sedimentation rate; Future  - CRP, inflammation; Future  - MR Brain w/o Contrast; Future    3. Malignant neoplasm of left breast in female, estrogen receptor negative, unspecified site of breast (H)  Disease-free since 2003 she tells me    4. Elevated BP without diagnosis of hypertension  She has had elevated blood pressure in the past but today blood pressure looks quite good.  I do not think this is the cause of her symptoms.        No follow-ups on file.     History of Present Illness   This 66 year old this is a very nice woman with a history of breast cancer many years ago who comes in today with over a week of left eye visual disturbance and eye pain.  Started with a semicircle of bright white light.  This was in the left.  It happened again.  Went to her eye doctor because she has a history of retinal detachment.  They told her her retina was fine.  They thought that she likely had ocular migraine.  They recommended she be seen by internist if her pain persisted.  With this she has had left-sided eye pain as well as facial pain.  No rashes.  No constitutional symptoms.  No neurologic symptoms otherwise.  She denies other new somatic concerns.    Review of  "Systems: A comprehensive review of systems was negative except as noted.     Medications, Allergies and Problem List   Reviewed, reconciled and updated  Post Discharge Medication Reconciliation Status:   Social History     Social History Narrative    [place]in[place]di[place]liv[place]ie[place]    Lives with  , family in town ,         Physical Exam   General Appearance:   Pleasant woman.  Eye appears normal.  No erythema warmth or rashes    /74 (BP Location: Right arm, Patient Position: Sitting, Cuff Size: Adult Regular)   Pulse 65   Temp 97.3  F (36.3  C) (Axillary)   Resp 16   Ht 1.651 m (5' 5\")   Wt 59.4 kg (131 lb)   SpO2 98%   BMI 21.80 kg/m           Additional Information   Current Outpatient Medications   Medication Sig Dispense Refill     aspirin 81 MG EC tablet [ASPIRIN 81 MG EC TABLET] Take 81 mg by mouth daily.       calcium carbonate-vitamin D2 500 mg(1,250mg) -200 unit tablet [CALCIUM CARBONATE-VITAMIN D2 500 MG(1,250MG) -200 UNIT TABLET] Take 1 tablet by mouth 3 (three) times a day.       cholecalciferol, vitamin D3, (VITAMIN D3) 2,000 unit cap [CHOLECALCIFEROL, VITAMIN D3, (VITAMIN D3) 2,000 UNIT CAP] Take 1,000 Units by mouth daily.        levothyroxine (SYNTHROID/LEVOTHROID) 50 MCG tablet TAKE 1 TABLET(50 MCG) BY MOUTH DAILY 90 tablet 3     multivitamin therapeutic (THERAGRAN) tablet [MULTIVITAMIN THERAPEUTIC (THERAGRAN) TABLET] Take 1 tablet by mouth daily.       rosuvastatin (CRESTOR) 5 MG tablet Take 1 tablet (5 mg) by mouth daily 90 tablet 3     Saccharomyces boulardii (FLORASTOR) 250 mg capsule [SACCHAROMYCES BOULARDII (FLORASTOR) 250 MG CAPSULE] Take 250 mg by mouth daily.       Allergies   Allergen Reactions     Atorvastatin Muscle Pain (Myalgia)     Lidocaine Unknown     Statins-Hmg-Coa Reductase Inhibitors [Hmg-Coa-R Inhibitors] Muscle Pain (Myalgia)     Social History     Tobacco Use     Smoking status: Never Smoker     Smokeless tobacco: " Never Used   Substance Use Topics     Alcohol use: No     Drug use: No       Review and/or order of clinical lab tests:  Review and/or order of radiology tests:  Review and/or order of medicine tests:  Discussion of test results with performing physician:  Decision to obtain old records and/or obtain history from someone other than the patient:  Review and summarization of old records and/or obtaining history from someone other than the patient and.or discussion of case with another health care provider:  Independent visualization of image, tracing or specimen itself:    Time:      REBECCA LANDON MD

## 2022-04-11 NOTE — TELEPHONE ENCOUNTER
[facility] calling to request copy of chart notes from visit today that accompany order for MRI of the brain that was called in to them earlier.  Stating that insurance review is needed before imaging can be performed and they need a copy of the chart note from today to justify PA with insurance.  Please fax copy of visit note to [phone] when note completed.

## 2022-04-11 NOTE — LETTER
April 12, 2022      Skylar Spaulding  [address]  [place] [address]        Dear ,    We are writing to inform you of your test results.    Inflammatory markers are very normal.  Good news.       Resulted Orders   ESR: Erythrocyte sedimentation rate   Result Value Ref Range    Erythrocyte Sedimentation Rate 11 0 - 20 mm/hr   CRP, inflammation   Result Value Ref Range    CRP 0.1 0.0 - 0.8 mg/dL       If you have any questions or concerns, please call the clinic at the number listed above.       Sincerely,      Anish Baumann MD

## 2022-04-15 ENCOUNTER — TRANSFERRED RECORDS (OUTPATIENT)
Dept: HEALTH INFORMATION MANAGEMENT | Facility: CLINIC | Age: 67
End: 2022-04-15

## 2022-04-20 ENCOUNTER — OFFICE VISIT (OUTPATIENT)
Dept: INTERNAL MEDICINE | Facility: CLINIC | Age: 67
End: 2022-04-20
Payer: COMMERCIAL

## 2022-04-20 VITALS
OXYGEN SATURATION: 99 % | HEART RATE: 80 BPM | BODY MASS INDEX: 22.12 KG/M2 | WEIGHT: 132.9 LBS | DIASTOLIC BLOOD PRESSURE: 84 MMHG | SYSTOLIC BLOOD PRESSURE: 138 MMHG

## 2022-04-20 DIAGNOSIS — H57.12 PAIN OF LEFT EYE: Primary | ICD-10-CM

## 2022-04-20 DIAGNOSIS — G43.109 OCULAR MIGRAINE: ICD-10-CM

## 2022-04-20 PROCEDURE — 99214 OFFICE O/P EST MOD 30 MIN: CPT | Performed by: INTERNAL MEDICINE

## 2022-04-20 NOTE — PROGRESS NOTES
Assessment & Plan     Pain of left eye  No evidence to suggest bacterial infection like crusting.  Most likely dry eyes    2. Ocular migraine  Symptoms are suggestive of ocular migraine she has that classic scotomas crescent shaped, with and without pain with no other features or neurological symptoms suggestive of TIAS . MRI reviewed and is essentially normal including the orbit.  She does not really merit treatment if she is feeling better if if episodes recur that she should let me know if there is signs of weakness dysarthria asymmetrical muscle weakness or amaurosis fugax-like symptoms then that would need much more investigation ( MRA ultrasound carotids and echocardiogram and cardiac event monitor.)  Giant cell arteritis essentially ruled out with normal sed rate                 Return if symptoms worsen or fail to improve, for Follow up.    Palmira Wray MD  [facility]   Skylar is a 66 year old who presents for the following health issues   Is being doing a lot of work with her helping her daughter who just had a baby cleaning and housekeeping.  And was very tired and severely fatigued noticed episodic still crescent shaped scotomas in her left eye not associated with any blurred change in vision or loss of vision.  There was some mild eye pain but no significant migraine.  Episodes occurred about once a week and then 3 times a week and lasted few hours.  She did see Dr. Harper saavedra sed rate CRP done which were negative and MRI done which were also negative.  She has not had the relapse of her symptoms.  She has seen her eye doctor and retinal exam was normal they did feel that she may have dry eyes and started on Pataday and and try refresh.  The refresh irritates her eyes but she feels a little better since that  History of Present Illness       Reason for visit:  MRI results and eye pain  Symptom onset:  1-2 weeks ago  Symptoms include:  Had two eye episodes,  then consistent eye pain  Symptom intensity:  Moderate  Symptom progression:  Staying the same  Had these symptoms before:  No  What makes it worse:  I have tried to not be on devices/ computer unless need to  What makes it better:  Warm + cold washrags on eye and advil    She eats 2-3 servings of fruits and vegetables daily.She consumes 1 sweetened beverage(s) daily.She exercises with enough effort to increase her heart rate 9 or less minutes per day.  She exercises with enough effort to increase her heart rate 3 or less days per week.   She is taking medications regularly.     The 10-year ASCVD risk score (Joan VILLEDA JrKi, et al., 2013) is: 6.2%    Values used to calculate the score:      Age: 66 years      Sex: Female      Is Non- : No      Diabetic: No      Tobacco smoker: No      Systolic Blood Pressure: 138 mmHg      Is BP treated: No      HDL Cholesterol: 62 mg/dL      Total Cholesterol: 204 mg/dL  Patient Active Problem List   Diagnosis     Hyperlipidemia     Postmenopausal     Breast cancer (H)     Health maintenance examination     Infection due to 2019 novel coronavirus     Current Outpatient Medications   Medication     aspirin 81 MG EC tablet     calcium carbonate-vitamin D2 500 mg(1,250mg) -200 unit tablet     cholecalciferol, vitamin D3, (VITAMIN D3) 2,000 unit cap     levothyroxine (SYNTHROID/LEVOTHROID) 50 MCG tablet     multivitamin therapeutic (THERAGRAN) tablet     rosuvastatin (CRESTOR) 5 MG tablet     Saccharomyces boulardii (FLORASTOR) 250 mg capsule     No current facility-administered medications for this visit.             Review of Systems   Constitutional, HEENT, cardiovascular, pulmonary, gi and gu systems are negative, except as otherwise noted.      Objective    /84 (BP Location: Left arm, Patient Position: Sitting, Cuff Size: Adult Regular)   Pulse 80   Wt 60.3 kg (132 lb 14.4 oz)   SpO2 99%   BMI 22.12 kg/m    Body mass index is 22.12 kg/m .  Physical  Exam   GENERAL: healthy, alert and no distress  NECK: no adenopathy, no asymmetry, masses, or scars and thyroid normal to palpation  RESP: lungs clear to auscultation - no rales, rhonchi or wheezes  CV: regular rate and rhythm, normal S1 S2, no S3 or S4, no murmur, click or rub, no peripheral edema and peripheral pulses strong  ABDOMEN: soft, nontender, no hepatosplenomegaly, no masses and bowel sounds normal  MS: no gross musculoskeletal defects noted, no edema  NEURO: Normal strength and tone, fasciculations none, tremor none, sensory exam grossly normal, mentation intact, cranial nerves 2-12 intact, gait normal including heel/toe/tandem walking, Romberg normal and rapid alternating movements normal

## 2022-04-21 NOTE — PROGRESS NOTES
[facility] Rehabilitation Service    Outpatient Physical Therapy Progress Note and Discharge Note  Patient: Skylar Spaulding  : 1955    Beginning/End Dates of Reporting Period:  21 to 22    Referring Provider: Palmira Wray MD    Therapy Diagnosis: SI joint dysfunction     Client Self Report: Patient reports the exercises are helping her, feels like the muscle work andmanual therapy in clinic has been very beneficial overall. Has purchased a tennis ball, was painful on the floor so she does it against the wall and wants to review this today, she is interested in advaning or adding to her exercises as well.     Objective Measurements:  Objective Measure: Lumbar ROM  Details: Flexion to mid-shin; Extension is WNL; side bending and rotation WNL  Objective Measure: Pelvic landmarks  Details: L LE appears longer supine; L ASIS appears slightly higher supine    Goals:  Goal Identifier Self-management/HEP   Goal Description Patient will be independent in self-management of condition and HEP   Target Date 22   Date Met      Progress (detail required for progress note):  Met     Goal Identifier Maneuver stairs   Goal Description Patient will be able to maneuver up and down stairs without pain or difficulty in order to watch her granddaughter.   Target Date 22   Date Met      Progress (detail required for progress note): Progressing, increased ease noted this week with steps     Goal Identifier Walking   Goal Description Patient will be able to walk 2 miles without pain or limping for returning to PLOF.   Target Date 22   Date Met      Progress (detail required for progress note): Progressing - walking with less pain , more pain with transitional movements     Goal Identifier Morning pain   Goal Description Patient will be able to get out of bed in the morning without pain for improved QOL.   Target Date  02/21/22   Date Met      Progress (detail required for progress note): progressing but still pain when getting in and out of bed, chair and car     Plan:  Discharge from therapy.    Discharge:    Reason for Discharge: Patient went to [place] for a month and has not returned to skilled PT services.  She is appropriate for discharge at this time.    Equipment Issued: Resistance band    Discharge Plan: Patient to continue home program.    Kamille Spears, PT, DPT, MHA  4/21/2022

## 2022-04-21 NOTE — ADDENDUM NOTE
Encounter addended by: Kamille Spears, PT on: 4/21/2022 9:08 AM   Actions taken: Clinical Note Signed, Episode resolved

## 2022-05-03 ENCOUNTER — MYC MEDICAL ADVICE (OUTPATIENT)
Dept: INTERNAL MEDICINE | Facility: CLINIC | Age: 67
End: 2022-05-03
Payer: COMMERCIAL

## 2022-05-03 DIAGNOSIS — J98.8 RESPIRATORY TRACT INFECTION: Primary | ICD-10-CM

## 2022-07-09 ENCOUNTER — TELEPHONE (OUTPATIENT)
Dept: NURSING | Facility: CLINIC | Age: 67
End: 2022-07-09

## 2022-07-09 NOTE — TELEPHONE ENCOUNTER
"Patient is calling and just tested positive for covid this am.  7/9/2022.  Today symptoms are body aches, sneezing and nasal issues, cough shortness of breath.  Patient declines triage as no symptoms are severe.    Coronavirus (COVID-19) Notification    Caller Name (Patient, parent, daughter/son, grandparent, etc)  Skylar Spaulding A    Reason for call  Notify of Positive Coronavirus (COVID-19) lab results, assess symptoms,  review [facility] recommendations    Lab Result    Lab test:  2019-nCoV rRt-PCR or SARS-CoV-2 PCR    Oropharyngeal AND/OR nasopharyngeal swabs is POSITIVE for 2019-nCoV RNA/SARS-COV-2 PCR (COVID-19 virus)    Brief introduction script  Introduce self then review script:  \"I am calling on behalf of [employer].  We were notified that your Coronavirus test (COVID-19) for was POSITIVE for the virus.\"    Gather patient reported symptoms   Assessment   Current Symptoms at time of phone call, reported by patient: (if no symptoms, document No symptoms]    Date of Symptom(s) onset (if applicable) 7/8/2022     If at time of call, Patients symptoms hare worsened, the Patient should contact 911 or have someone drive them to Emergency Dept promptly:      If Patient calling 911, inform 911 personal that you have tested positive for the Coronavirus (COVID-19).  Place mask on and await 911 to arrive.    If Emergency Dept, If possible, please have another adult drive you to the Emergency Dept but you need to wear mask when in contact with other people.      Monoclonal Antibody Administration    You may be eligible to receive a new treatment with a monoclonal antibody for preventing hospitalization in patients at high risk for complications from COVID-19. This medication is still experimental and available on a limited basis; it is given through an IV and must be given at an infusion center. Please note that not all people who are eligible will receive the medication since it is in limited supply.  Is " the patient symptomatic and onset of symptoms within the last 7 days?  Yes  Is the patient interested in a visit with a provider to discuss treatment options?: Yes  Is the patient seen at [facility]?  No: Warm transfer caller to [phone] to be scheduled with a virtual urgent provider.  During transfer, instruct  on appropriate time frame for visit     Review information with Patient    Your result was positive. This means you have COVID-19 (coronavirus).      How can I protect others?    These guidelines are for isolating before returning to work, school or .       If you DO have symptoms:  o Stay home and away from others  - For at least 5 days after your symptoms started, AND   - You are fever free for 24 hours (with no medicine that reduces fever), AND  - Your other symptoms are better.  o Wear a mask for 10 full days any time you are around others.    If you DON'T have symptoms:  o Stay at home and away from others for at least 5 days after your positive test.  o Wear a mask for 10 full days any time you are around others.    There may be different guidelines for healthcare facilities. Please check with the specific sites before arriving.     If you've been told by a doctor that you were severely ill with COVID-19 or are immunocompromised, you should isolate for at least 10 days.    You should not go back to work until you meet the guidelines above for ending your home isolation. You don't need to be retested for COVID-19 before going back to work--studies show that you won't spread the virus if it's been at least 10 days since your symptoms started (or 20 days, if you have a weak immune system).    Employers, schools, and daycares: This is an official notice for this person's medical guidelines for returning in-person. They must meet the above guidelines before going back to work, school, or  in person.    You will receive a positive COVID-19 letter via [employer] or the mail  soon with additional self-care information.      Would you like me to review some of that information with you now?  Yes    How can I take care of myself?      Get lots of rest. Drink extra fluids (unless a doctor has told you not to).      Take Tylenol (acetaminophen) for fever or pain. If you have liver or kidney problems, ask your family doctor if it's okay to take Tylenol.     Take either:     650 mg (two 325 mg pills) every 4 to 6 hours, or     1,000 mg (two 500 mg pills) every 8 hours as needed.     Note: Do not take more than 3,000 mg in one day. Acetaminophen is found in many medicines (both prescribed and over-the-counter medicines). Read all labels to be sure you don't take too much.    For children, check the Tylenol bottle for the right dose (based on their age or weight).      If you have other health problems (like cancer, heart failure, an organ transplant or severe kidney disease): Call your specialty clinic if you don't feel better in the next 2 days.      Know when to call 911: Emergency warning signs include:    Trouble breathing or shortness of breath    Pain or pressure in the chest that doesn't go away    Feeling confused like you haven't felt before, or not being able to wake up    Bluish-colored lips or face        If you were tested for an upcoming procedure, please contact your provider for next steps.     Arlet Orantes RN

## 2022-07-11 ENCOUNTER — VIRTUAL VISIT (OUTPATIENT)
Dept: URGENT CARE | Facility: CLINIC | Age: 67
End: 2022-07-11
Payer: COMMERCIAL

## 2022-07-11 ENCOUNTER — NURSE TRIAGE (OUTPATIENT)
Dept: NURSING | Facility: CLINIC | Age: 67
End: 2022-07-11

## 2022-07-11 ENCOUNTER — MYC MEDICAL ADVICE (OUTPATIENT)
Dept: URGENT CARE | Facility: CLINIC | Age: 67
End: 2022-07-11

## 2022-07-11 DIAGNOSIS — U07.1 CLINICAL DIAGNOSIS OF COVID-19: Primary | ICD-10-CM

## 2022-07-11 PROCEDURE — 99213 OFFICE O/P EST LOW 20 MIN: CPT | Mod: CS

## 2022-07-11 NOTE — TELEPHONE ENCOUNTER
Spoke with pt and gave her the information from Dr. Guo.     Pt would like to hear from Dr. Wray if she should take the paxlovid if it seems like her symptoms are improving? Tomorrow 7/12/22 is the 5th day of symptoms, the last day to start the medication.     Pt is also wondering if she is able to take over the counter cough medicines with this medication.     Pt would like to wait until tomorrow morning to have a response from Dr. Wray regarding these medication questions.     Pt would like a call on her mobile phone tomorrow with Dr. Wray's advice.

## 2022-07-11 NOTE — PROGRESS NOTES
"Skylar is a 66 year old who is being evaluated via a billable video visit.      Tested + 7/9.  Sx started 7/8.  COVID vaccinated and boosted.  URI sx.  Had COVID in January as well.    How would you like to obtain your AVS? MyChart  If the video visit is dropped, the invitation should be resent by: Send to e-mail at: [email or web address]  Will anyone else be joining your video visit? No    Assessment & Plan     Clinical diagnosis of COVID-19    - nirmatrelvir and ritonavir (PAXLOVID) therapy pack; Take 3 tablets by mouth 2 times daily for 5 days Take 2 Nirmatrelvir tablets and 1 Ritonavir tablet twice daily for 5 days.    COVID-19 positive patient.  Encounter for consideration of medication intervention. Patient does qualify for a prescription. Full discussion with patient including medication options, risks and benefits. Potential drug interactions reviewed with patient.     Treatment Planned Paxlovid sent to [facility]    Temporary change to home medications:   Hold Crestor while on Paxlovid x 5 days then resume one week after last Paxlovid dose.    Estimated body mass index is 22.12 kg/m  as calculated from the following:    Height as of 4/11/22: 1.651 m (5' 5\").    Weight as of 4/20/22: 60.3 kg (132 lb 14.4 oz).  GFR Estimate   Date Value Ref Range Status   02/01/2022 81 >60 mL/min/1.73m2 Final     Comment:     Effective December 21, 2021 eGFRcr in adults is calculated using the 2021 CKD-EPI creatinine equation which includes age and gender (Carol et al., NEJ, DOI: 10.1056/BJUKfa1933444)   02/02/2021 >60 >60 mL/min/1.73m2 Final     Shirley Monsivais MD  Virtual Urgent Care  [facility] VIRTUAL URGENT CARE    Subjective   Skylar is a 66 year old, presenting for the following health issues:  No chief complaint on file.      HPI       Tested + 7/9.  Sx started 7/8.  COVID vaccinated and boosted.  URI sx.  Had COVID in January as well.  No SOB.      Review of Systems   Constitutional, HEENT, " cardiovascular, pulmonary, GI, , musculoskeletal, neuro, skin, endocrine and psych systems are negative, except as otherwise noted.      Objective           Vitals:  No vitals were obtained today due to virtual visit.    Physical Exam   GENERAL: Healthy, alert and no distress  EYES: Eyes grossly normal to inspection.  No discharge or erythema, or obvious scleral/conjunctival abnormalities.  RESP: No audible wheeze, cough, or visible cyanosis.  No visible retractions or increased work of breathing.    SKIN: Visible skin clear. No significant rash, abnormal pigmentation or lesions.  NEURO: Cranial nerves grossly intact.  Mentation and speech appropriate for age.  PSYCH: Mentation appears normal, affect normal/bright, judgement and insight intact, normal speech and appearance well-groomed.                Video-Visit Details    Video Start Time: 8:55 AM    Type of service:  Video Visit    Video End Time:9:10 AM    Originating Location (pt. Location): Home    Distant Location (provider location):  [facility] [employer] URGENT CARE     Platform used for Video Visit: Veristorm    .  ..

## 2022-07-11 NOTE — TELEPHONE ENCOUNTER
There may be an increased risk of adverse effect related to statins while on Paxlovid.  Over this 1 day, it is unlikely that she will experience adverse effects therefore I believe it would be safe for her to start Paxlovid today, and to hold rosuvastatin as directed by the ordering physician.

## 2022-07-11 NOTE — TELEPHONE ENCOUNTER
Pt was seen on [facility] appt today and received Rx of Paxlovid.  Has a couple questions:     1.  Pt took her Crestor today and is aware that she needs to NOT take it while taking Paxlovid.  Does this mean she can't start her Paxlovid until tomorrow?  Or can she take today in spite of taking her Crestor earlier this morning?  Pt did reach out to her Pharmacist who was unable to answer this question and routed pt to her PCP to get this information.      2.  Pt states she would prefer to not take this unless it's highly recommended and states she is feeling improved today with the exception of a mild cough that did develop today.  Concerns surrounding the potential side effects of this medication.  Pt wants to do the right thing, just would like clarity on what is the right thing.      Please advise.  Best # to reach pt at is her cell:  [phone]    Bridget Singh RN  [facility] Triage Nurse Advisor   7/11/2022 2:13 PM     Additional Information    Nursing judgment    Protocols used: INFORMATION ONLY CALL - NO TRIAGE-A-[place]    COVID 19 Nurse Triage Plan/Patient Instructions    Please be aware that novel coronavirus (COVID-19) may be circulating in the community. If you develop symptoms such as fever, cough, or SOB or if you have concerns about the presence of another infection including coronavirus (COVID-19), please contact your health care provider or visit [email or web address].     Disposition/Instructions    Home care recommended. Follow home care protocol based instructions.    Thank you for taking steps to prevent the spread of this virus.  o Limit your contact with others.  o Wear a simple mask to cover your cough.  o Wash your hands well and often.    Resources    [facility]: About COVID-19: [email or web address]    CDC: What to Do If You're Sick: www.cdc.gov/coronavirus/2019-ncov/about/steps-when-sick.html    CDC: Ending Home Isolation:  www.cdc.gov/coronavirus/2019-ncov/hcp/disposition-in-home-patients.html     CDC: Caring for Someone: www.cdc.gov/coronavirus/2019-ncov/if-you-are-sick/care-for-someone.html     [facility]: Interim Guidance for Hospital Discharge to Home: www.[facility].[place]/diseases/coronavirus/hcp/hospdischarge.pdf    [facility] clinical trials (COVID-19 research studies): clinicalaffairs.[facility]/n-clinical-trials     Below are the COVID-19 hotlines at the [place] Department of [facility] ([facility]). Interpreters are available.   o For health questions: Call 451-277-7053 or 1-604.664.4467 (7 a.m. to 7 p.m.)  o For questions about schools and childcare: Call 291-501-7704 or 1-929.715.7228 (7 a.m. to 7 p.m.)

## 2022-07-11 NOTE — TELEPHONE ENCOUNTER
Pt was seen at urgent care today and prescribed paxlovid, she was given the following instructions:     Temporary change to home medications:   Hold Crestor while on Paxlovid x 5 days then resume one week after last Paxlovid dose.    [facility] asking that if she took her crestor this morning if she should wait until tomorrow to start paxlovid or if she is okay to take paxlovid this evening.       Routing to pharmD and will call back pt by end of day with information regarding paxlovid question.

## 2022-07-12 NOTE — TELEPHONE ENCOUNTER
Spoke with Skylar and gave her the recommendation from Dr. Wray. Pt said that she is not going to take the paxlovid since she is feeling better. Pt does not need anything further from the clinic at this time.

## 2022-07-12 NOTE — TELEPHONE ENCOUNTER
There is no risk to her for having taken the crestor that day and she could have started the paxlovid , however given it is day 5 and she is feeling better now , taking paxlovid is optional . If she doesn't feel like taking it that is ok .

## 2022-10-01 ENCOUNTER — HEALTH MAINTENANCE LETTER (OUTPATIENT)
Age: 67
End: 2022-10-01

## 2022-10-20 ENCOUNTER — IMMUNIZATION (OUTPATIENT)
Dept: FAMILY MEDICINE | Facility: CLINIC | Age: 67
End: 2022-10-20
Payer: COMMERCIAL

## 2022-10-20 PROCEDURE — G0008 ADMIN INFLUENZA VIRUS VAC: HCPCS

## 2022-10-20 PROCEDURE — 90662 IIV NO PRSV INCREASED AG IM: CPT

## 2022-11-14 DIAGNOSIS — E06.3 HYPOTHYROIDISM DUE TO HASHIMOTO'S THYROIDITIS: ICD-10-CM

## 2022-11-15 RX ORDER — LEVOTHYROXINE SODIUM 50 UG/1
TABLET ORAL
Qty: 90 TABLET | Refills: 3 | Status: SHIPPED | OUTPATIENT
Start: 2022-11-15 | End: 2023-02-02

## 2022-11-15 NOTE — TELEPHONE ENCOUNTER
"Routing refill request to provider for review/approval because:  Early refill request    Last Written Prescription Date:  2/2/2022  Last Fill Quantity: 90,  # refills: 3   Last office visit provider:  4/20/2022     Requested Prescriptions   Pending Prescriptions Disp Refills     levothyroxine (SYNTHROID/LEVOTHROID) 50 MCG tablet 90 tablet 3     Sig: TAKE 1 TABLET(50 MCG) BY MOUTH DAILY       Thyroid Protocol Passed - 11/15/2022  8:31 AM        Passed - Patient is 12 years or older        Passed - Recent (12 mo) or future (30 days) visit within the authorizing provider's specialty     Patient has had an office visit with the authorizing provider or a provider within the authorizing providers department within the previous 12 mos or has a future within next 30 days. See \"Patient Info\" tab in inbasket, or \"Choose Columns\" in Meds & Orders section of the refill encounter.              Passed - Medication is active on med list        Passed - Normal TSH on file in past 12 months     Recent Labs   Lab Test 11/29/21  1213   TSH 1.59              Passed - No active pregnancy on record     If patient is pregnant or has had a positive pregnancy test, please check TSH.          Passed - No positive pregnancy test in past 12 months     If patient is pregnant or has had a positive pregnancy test, please check TSH.               Skylar Chavez RN 11/15/22 8:32 AM  "

## 2022-12-19 ENCOUNTER — LAB REQUISITION (OUTPATIENT)
Dept: LAB | Facility: CLINIC | Age: 67
End: 2022-12-19
Payer: COMMERCIAL

## 2022-12-19 DIAGNOSIS — Z12.4 ENCOUNTER FOR SCREENING FOR MALIGNANT NEOPLASM OF CERVIX: ICD-10-CM

## 2022-12-19 PROCEDURE — 87624 HPV HI-RISK TYP POOLED RSLT: CPT | Mod: ORL | Performed by: OBSTETRICS & GYNECOLOGY

## 2022-12-19 PROCEDURE — G0145 SCR C/V CYTO,THINLAYER,RESCR: HCPCS | Mod: ORL | Performed by: OBSTETRICS & GYNECOLOGY

## 2022-12-22 LAB
BKR LAB AP GYN ADEQUACY: NORMAL
BKR LAB AP GYN INTERPRETATION: NORMAL
BKR LAB AP HPV REFLEX: NORMAL
BKR LAB AP LMP: NORMAL
BKR LAB AP PREVIOUS ABNL DX: NORMAL
BKR LAB AP PREVIOUS ABNORMAL: NORMAL
PATH REPORT.COMMENTS IMP SPEC: NORMAL
PATH REPORT.COMMENTS IMP SPEC: NORMAL
PATH REPORT.RELEVANT HX SPEC: NORMAL

## 2022-12-23 LAB
HUMAN PAPILLOMA VIRUS 16 DNA: NEGATIVE
HUMAN PAPILLOMA VIRUS 18 DNA: NEGATIVE
HUMAN PAPILLOMA VIRUS FINAL DIAGNOSIS: NORMAL
HUMAN PAPILLOMA VIRUS OTHER HR: NEGATIVE

## 2023-02-01 ASSESSMENT — ENCOUNTER SYMPTOMS
ABDOMINAL PAIN: 0
WEAKNESS: 0
FEVER: 0
COUGH: 0
FREQUENCY: 0
HEMATURIA: 0
DIZZINESS: 0
SORE THROAT: 0
SHORTNESS OF BREATH: 0
NERVOUS/ANXIOUS: 0
ARTHRALGIAS: 0
DYSURIA: 0
CONSTIPATION: 0
HEMATOCHEZIA: 0
MYALGIAS: 0
BREAST MASS: 0
PALPITATIONS: 0
DIARRHEA: 0
EYE PAIN: 0
CHILLS: 0
HEARTBURN: 0
NAUSEA: 0
HEADACHES: 0
JOINT SWELLING: 0
PARESTHESIAS: 0

## 2023-02-01 ASSESSMENT — ACTIVITIES OF DAILY LIVING (ADL): CURRENT_FUNCTION: NO ASSISTANCE NEEDED

## 2023-02-02 ENCOUNTER — OFFICE VISIT (OUTPATIENT)
Dept: INTERNAL MEDICINE | Facility: CLINIC | Age: 68
End: 2023-02-02
Payer: COMMERCIAL

## 2023-02-02 VITALS
DIASTOLIC BLOOD PRESSURE: 67 MMHG | BODY MASS INDEX: 20.49 KG/M2 | SYSTOLIC BLOOD PRESSURE: 143 MMHG | OXYGEN SATURATION: 99 % | WEIGHT: 123 LBS | HEIGHT: 65 IN | HEART RATE: 80 BPM | TEMPERATURE: 98.2 F | RESPIRATION RATE: 18 BRPM

## 2023-02-02 DIAGNOSIS — Z00.00 HEALTH MAINTENANCE EXAMINATION: Primary | ICD-10-CM

## 2023-02-02 DIAGNOSIS — M81.0 AGE-RELATED OSTEOPOROSIS WITHOUT CURRENT PATHOLOGICAL FRACTURE: ICD-10-CM

## 2023-02-02 DIAGNOSIS — E55.9 VITAMIN D DEFICIENCY: ICD-10-CM

## 2023-02-02 DIAGNOSIS — E06.3 HYPOTHYROIDISM DUE TO HASHIMOTO'S THYROIDITIS: ICD-10-CM

## 2023-02-02 DIAGNOSIS — E78.5 HYPERLIPIDEMIA, UNSPECIFIED HYPERLIPIDEMIA TYPE: ICD-10-CM

## 2023-02-02 DIAGNOSIS — R79.9 ABNORMAL FINDING OF BLOOD CHEMISTRY, UNSPECIFIED: ICD-10-CM

## 2023-02-02 DIAGNOSIS — Z17.1 MALIGNANT NEOPLASM OF LEFT BREAST IN FEMALE, ESTROGEN RECEPTOR NEGATIVE, UNSPECIFIED SITE OF BREAST (H): ICD-10-CM

## 2023-02-02 DIAGNOSIS — E78.2 MIXED HYPERLIPIDEMIA: ICD-10-CM

## 2023-02-02 DIAGNOSIS — M85.80 OSTEOPENIA, UNSPECIFIED LOCATION: ICD-10-CM

## 2023-02-02 DIAGNOSIS — C50.912 MALIGNANT NEOPLASM OF LEFT BREAST IN FEMALE, ESTROGEN RECEPTOR NEGATIVE, UNSPECIFIED SITE OF BREAST (H): ICD-10-CM

## 2023-02-02 LAB
ALBUMIN SERPL BCG-MCNC: 4.3 G/DL (ref 3.5–5.2)
ALP SERPL-CCNC: 56 U/L (ref 35–104)
ALT SERPL W P-5'-P-CCNC: 20 U/L (ref 10–35)
ANION GAP SERPL CALCULATED.3IONS-SCNC: 12 MMOL/L (ref 7–15)
AST SERPL W P-5'-P-CCNC: 32 U/L (ref 10–35)
BILIRUB SERPL-MCNC: 0.3 MG/DL
BUN SERPL-MCNC: 12.4 MG/DL (ref 8–23)
CALCIUM SERPL-MCNC: 9.3 MG/DL (ref 8.8–10.2)
CHLORIDE SERPL-SCNC: 100 MMOL/L (ref 98–107)
CHOLEST SERPL-MCNC: 208 MG/DL
CREAT SERPL-MCNC: 0.84 MG/DL (ref 0.51–0.95)
DEPRECATED HCO3 PLAS-SCNC: 26 MMOL/L (ref 22–29)
ERYTHROCYTE [DISTWIDTH] IN BLOOD BY AUTOMATED COUNT: 12.5 % (ref 10–15)
GFR SERPL CREATININE-BSD FRML MDRD: 76 ML/MIN/1.73M2
GLUCOSE SERPL-MCNC: 101 MG/DL (ref 70–99)
HBA1C MFR BLD: 5.9 % (ref 0–5.6)
HCT VFR BLD AUTO: 43.5 % (ref 35–47)
HDLC SERPL-MCNC: 61 MG/DL
HGB BLD-MCNC: 14.1 G/DL (ref 11.7–15.7)
LDLC SERPL CALC-MCNC: 126 MG/DL
MCH RBC QN AUTO: 29.1 PG (ref 26.5–33)
MCHC RBC AUTO-ENTMCNC: 32.4 G/DL (ref 31.5–36.5)
MCV RBC AUTO: 90 FL (ref 78–100)
NONHDLC SERPL-MCNC: 147 MG/DL
PLATELET # BLD AUTO: 182 10E3/UL (ref 150–450)
POTASSIUM SERPL-SCNC: 4.3 MMOL/L (ref 3.4–5.3)
PROT SERPL-MCNC: 7.1 G/DL (ref 6.4–8.3)
RBC # BLD AUTO: 4.84 10E6/UL (ref 3.8–5.2)
SODIUM SERPL-SCNC: 138 MMOL/L (ref 136–145)
TRIGL SERPL-MCNC: 107 MG/DL
TSH SERPL DL<=0.005 MIU/L-ACNC: 1.76 UIU/ML (ref 0.3–4.2)
WBC # BLD AUTO: 2.7 10E3/UL (ref 4–11)

## 2023-02-02 PROCEDURE — 80053 COMPREHEN METABOLIC PANEL: CPT | Performed by: INTERNAL MEDICINE

## 2023-02-02 PROCEDURE — 82306 VITAMIN D 25 HYDROXY: CPT | Performed by: INTERNAL MEDICINE

## 2023-02-02 PROCEDURE — G0438 PPPS, INITIAL VISIT: HCPCS | Performed by: INTERNAL MEDICINE

## 2023-02-02 PROCEDURE — 84443 ASSAY THYROID STIM HORMONE: CPT | Performed by: INTERNAL MEDICINE

## 2023-02-02 PROCEDURE — 36415 COLL VENOUS BLD VENIPUNCTURE: CPT | Performed by: INTERNAL MEDICINE

## 2023-02-02 PROCEDURE — 99214 OFFICE O/P EST MOD 30 MIN: CPT | Mod: 25 | Performed by: INTERNAL MEDICINE

## 2023-02-02 PROCEDURE — 85027 COMPLETE CBC AUTOMATED: CPT | Performed by: INTERNAL MEDICINE

## 2023-02-02 PROCEDURE — 80061 LIPID PANEL: CPT | Performed by: INTERNAL MEDICINE

## 2023-02-02 PROCEDURE — 83036 HEMOGLOBIN GLYCOSYLATED A1C: CPT | Performed by: INTERNAL MEDICINE

## 2023-02-02 RX ORDER — LEVOTHYROXINE SODIUM 50 UG/1
TABLET ORAL
Qty: 90 TABLET | Refills: 3 | Status: SHIPPED | OUTPATIENT
Start: 2023-02-02 | End: 2024-01-23

## 2023-02-02 RX ORDER — ATENOLOL 25 MG/1
12.5 TABLET ORAL DAILY
Qty: 90 TABLET | Refills: 3 | Status: SHIPPED | OUTPATIENT
Start: 2023-02-02 | End: 2024-02-05

## 2023-02-02 RX ORDER — ROSUVASTATIN CALCIUM 5 MG/1
5 TABLET, COATED ORAL DAILY
Qty: 90 TABLET | Refills: 3 | Status: SHIPPED | OUTPATIENT
Start: 2023-02-02 | End: 2024-01-23

## 2023-02-02 ASSESSMENT — ENCOUNTER SYMPTOMS
MYALGIAS: 0
PARESTHESIAS: 0
ABDOMINAL PAIN: 0
SORE THROAT: 0
ARTHRALGIAS: 0
SHORTNESS OF BREATH: 0
WEAKNESS: 0
EYE PAIN: 0
PALPITATIONS: 0
JOINT SWELLING: 0
DIZZINESS: 0
HEMATOCHEZIA: 0
CHILLS: 0
HEARTBURN: 0
COUGH: 0
NAUSEA: 0
HEADACHES: 0
BREAST MASS: 0
FREQUENCY: 0
NERVOUS/ANXIOUS: 0
HEMATURIA: 0
DYSURIA: 0
FEVER: 0
DIARRHEA: 0
CONSTIPATION: 0

## 2023-02-02 ASSESSMENT — ACTIVITIES OF DAILY LIVING (ADL): CURRENT_FUNCTION: NO ASSISTANCE NEEDED

## 2023-02-02 NOTE — PATIENT INSTRUCTIONS
Target systolic BP is less 130   TDAP is due and given at the pharmacy   You are eligible for the Prevnar 20  Can take allegra /fexofenadine for chronic allergies

## 2023-02-02 NOTE — PROGRESS NOTES
"SUBJECTIVE:   Skylar is a 67 year old who presents for Preventive Visit.  Breast cancer in sister   Is wondering BRCA   Sister has alzhemiers        No data recorded had chronic allergie   Patient has been advised of split billing requirements and indicates understanding: Yes  Are you in the first 12 months of your Medicare coverage?  No    Healthy Habits:     In general, how would you rate your overall health?  Excellent    Frequency of exercise:  1 day/week    Duration of exercise:  15-30 minutes    Do you usually eat at least 4 servings of fruit and vegetables a day, include whole grains    & fiber and avoid regularly eating high fat or \"junk\" foods?  Yes    Taking medications regularly:  Yes    Medication side effects:  None    Ability to successfully perform activities of daily living:  No assistance needed    Home Safety:  No safety concerns identified    Hearing Impairment:  No hearing concerns    In the past 6 months, have you been bothered by leaking of urine?  No    In general, how would you rate your overall mental or emotional health?  Excellent      PHQ-2 Total Score: 0    Additional concerns today:  No      Have you ever done Advance Care Planning? (For example, a Health Directive, POLST, or a discussion with a medical provider or your loved ones about your wishes): Yes, patient states has an Advance Care Planning document and will bring a copy to the clinic.       Fall risk  Fallen 2 or more times in the past year?: No  Any fall with injury in the past year?: No    Cognitive Screening see below     Do you have sleep apnea, excessive snoring or daytime drowsiness?: no    Reviewed and updated as needed this visit by clinical staff   Tobacco  Allergies  Meds              Reviewed and updated as needed this visit by Provider                 Social History     Tobacco Use     Smoking status: Never     Smokeless tobacco: Never   Substance Use Topics     Alcohol use: No         Alcohol Use 2/1/2023 "   Prescreen: >3 drinks/day or >7 drinks/week? Not Applicable               Current providers sharing in care for this patient include:   Patient Care Team:  Palmira Wray MD as PCP - General (Internal Medicine)  Palmira Wray MD as Assigned PCP    The following health maintenance items are reviewed in Epic and correct as of today:  Health Maintenance   Topic Date Due     COVID-19 Vaccine (4 - Booster for Pfizer series) 12/14/2021     DTAP/TDAP/TD IMMUNIZATION (6 - Td or Tdap) 10/31/2022     ANNUAL REVIEW OF HM ORDERS  11/29/2022     MEDICARE ANNUAL WELLNESS VISIT  02/01/2023     FALL RISK ASSESSMENT  02/02/2024     MAMMO SCREENING  12/13/2024     COLORECTAL CANCER SCREENING  04/20/2025     LIPID  02/01/2027     ADVANCE CARE PLANNING  02/01/2027     DEXA  11/11/2035     HEPATITIS C SCREENING  Completed     PHQ-2 (once per calendar year)  Completed     INFLUENZA VACCINE  Completed     Pneumococcal Vaccine: 65+ Years  Completed     ZOSTER IMMUNIZATION  Completed     IPV IMMUNIZATION  Aged Out     MENINGITIS IMMUNIZATION  Aged Out     PAP  Discontinued           FHS-7:   Breast CA Risk Assessment (FHS-7) 2/1/2022 2/1/2023   Did any of your first-degree relatives have breast or ovarian cancer? Yes Yes   Did any of your relatives have bilateral breast cancer? No No   Did any man in your family have breast cancer? No No   Did any woman in your family have breast and ovarian cancer? Yes No   Did any woman in your family have breast cancer before age 50 y? No No   Do you have 2 or more relatives with breast and/or ovarian cancer? No No   Do you have 2 or more relatives with breast and/or bowel cancer? No Yes         Pertinent mammograms are reviewed under the imaging tab.    Review of Systems   Constitutional: Negative for chills and fever.   HENT: Negative for congestion, ear pain, hearing loss and sore throat.    Eyes: Negative for pain and visual disturbance.   Respiratory: Negative for cough and shortness of  breath.    Cardiovascular: Negative for chest pain, palpitations and peripheral edema.   Gastrointestinal: Negative for abdominal pain, constipation, diarrhea, heartburn, hematochezia and nausea.   Breasts:  Negative for tenderness, breast mass and discharge.   Genitourinary: Negative for dysuria, frequency, genital sores, hematuria, pelvic pain, urgency, vaginal bleeding and vaginal discharge.   Musculoskeletal: Negative for arthralgias, joint swelling and myalgias.   Skin: Negative for rash.   Neurological: Negative for dizziness, weakness, headaches and paresthesias.   Psychiatric/Behavioral: Negative for mood changes. The patient is not nervous/anxious.      Current Outpatient Medications   Medication     aspirin 81 MG EC tablet          calcium carbonate-vitamin D2 500 mg(1,250mg) -200 unit tablet     cholecalciferol, vitamin D3, (VITAMIN D3) 2,000 unit cap     levothyroxine (SYNTHROID/LEVOTHROID) 50 MCG tablet     multivitamin therapeutic (THERAGRAN) tablet     rosuvastatin (CRESTOR) 5 MG tablet     Saccharomyces boulardii (FLORASTOR) 250 mg capsule     No current facility-administered medications for this visit.     Six Item Cognitive Impairment Test   (6CIT):      What year is it?                               Correct - 0 points    What month is it?                               Correct - 0 points      Give the patient an address to remember with five components:   [place] ( first and last name - 2 components)   [address]  (number and name of street - 2 components)   [place] ( city - 1 component)      About what time is it (within the hour)? Correct - 0 points    Count backwards from 20 to 1:   Correct - 0 points    Say the months of the year in reverse: Correct - 0 points    Repeat the address phrase:   1 error - 2 points    Total 6CIT Score:      2/28    Interpretation: The 6CIT uses an inverse score and questions are weighted to produce a total out of 28. Scores of 0-7 are considered normal  "and 8 or more significant.    Advantages The test has high sensitivity without compromising specificity even in mild dementia. It is easy to translate linguistically and culturally.  Disadvantages The main disadvantage is in the scoring and weighting of the test, which is initially confusing, however computer models have simplified this greatly.    Probability Statistics: At the 7/8 cut off: Overall figures sensitivity 90% specificity 100%, in mild dementia sensitivity = 78% , specificity = 100%    Copyright 2000 [facility], [facility]. Courtesy of Dr. Esau Archuleta    Constitutional, HEENT, cardiovascular, pulmonary, gi and gu systems are negative, except as otherwise noted.    OBJECTIVE:   BP (!) 143/67 (BP Location: Right arm, Patient Position: Sitting, Cuff Size: Adult Regular)   Pulse 80   Temp 98.2  F (36.8  C) (Tympanic)   Resp 18   Ht 1.638 m (5' 4.5\")   Wt 55.8 kg (123 lb)   SpO2 99%   BMI 20.79 kg/m   Estimated body mass index is 20.79 kg/m  as calculated from the following:    Height as of this encounter: 1.638 m (5' 4.5\").    Weight as of this encounter: 55.8 kg (123 lb).  Physical Exam  GENERAL: healthy, alert and no distress  EYES: Eyes grossly normal to inspection, PERRL and conjunctivae and sclerae normal  HENT: ear canals and TM's normal, nose and mouth without ulcers or lesions  NECK: no adenopathy, no asymmetry, masses, or scars and thyroid normal to palpation  RESP: lungs clear to auscultation - no rales, rhonchi or wheezes  BREAST: normal without masses, tenderness or nipple discharge and no palpable axillary masses or adenopathy  CV: regular rate and rhythm, normal S1 S2, no S3 or S4, no murmur, click or rub, no peripheral edema and peripheral pulses strong  ABDOMEN: soft, nontender, no hepatosplenomegaly, no masses and bowel sounds normal  MS: no gross musculoskeletal defects noted, no edema  SKIN: no suspicious lesions or rashes  NEURO: Normal strength and tone, " mentation intact and speech normal  PSYCH: mentation appears normal, affect normal/bright    Christopher's testing is normal    ASSESSMENT / PLAN:   (Z00.00) Health maintenance examination  (primary encounter diagnosis)  Comment:   Plan: mammo annual ( piper breast cancer )   Colon has a tortuous colon and was told not to get it done again. Then had   colon enterography and then virtual colonoscopy 2020  ( had a horrible experience will do cologard in 2025  dexa 2018, 2021   Pap smear    pap done by gyn     (C50.912,  Z17.1) Malignant neoplasm of left breast in female, estrogen receptor negative, unspecified site of breast (H)  Comment:   Plan: sister has had breast cancer .  Does not impact her she had BRCA testing done and was negative.  Repeating it I do not will not be helpful.  Her children though her could consider genetic counseling.    (E78.2) Mixed hyperlipidemia  Comment: The 10-year ASCVD risk score (Ron ART, et al., 2019) is: 7.5%    Values used to calculate the score:      Age: 67 years      Sex: Female      Is Non- : No      Diabetic: No      Tobacco smoker: No      Systolic Blood Pressure: 143 mmHg      Is BP treated: No      HDL Cholesterol: 61 mg/dL      Total Cholesterol: 208 mg/dL  Coronary calcium score 0 Is on low merino crestor for many years   Plan: Lipid panel reflex to direct LDL Fasting,         Comprehensive metabolic panel, CBC with         platelets, TSH, Hemoglobin A1c, atenolol         (TENORMIN) 25 MG tablet            (R79.9) Abnormal finding of blood chemistry, unspecified  Comment:   Plan: Hemoglobin A1c            (E03.8,  E06.3) Hypothyroidism due to Hashimoto's thyroiditis  Comment:   Plan: levothyroxine (SYNTHROID/LEVOTHROID) 50 MCG         tablet          TSH   Date Value Ref Range Status   02/02/2023 1.76 0.30 - 4.20 uIU/mL Final   11/29/2021 1.59 0.30 - 5.00 uIU/mL Final     Able thyroid function.    (E78.5) Hyperlipidemia, unspecified hyperlipidemia  type  Comment:   Plan: rosuvastatin (CRESTOR) 5 MG tablet            (E55.9) Vitamin D deficiency  Comment:   Plan: Vitamin D Deficiency            (M85.80) Osteopenia, unspecified location  Comment:   Plan: DX Hip/Pelvis/Spine            (M81.0) Age-related osteoporosis without current pathological fracture  Comment:   Plan: DX Hip/Pelvis/Spine          Overall doing really well in excellent health.    Patient has been advised of split billing requirements and indicates understanding: Yes      COUNSELING:  Reviewed preventive health counseling, as reflected in patient instructions        She reports that she has never smoked. She has never used smokeless tobacco.      Appropriate preventive services were discussed with this patient, including applicable screening as appropriate for cardiovascular disease, diabetes, osteopenia/osteoporosis, and glaucoma.  As appropriate for age/gender, discussed screening for colorectal cancer, prostate cancer, breast cancer, and cervical cancer. Checklist reviewing preventive services available has been given to the patient.    Reviewed patients plan of care and provided an AVS. The Basic Care Plan (routine screening as documented in Health Maintenance) for Skylar meets the Care Plan requirement. This Care Plan has been established and reviewed with the Patient.          Palmira Wray MD  [facility]    Identified Health Risks:

## 2023-02-03 LAB — DEPRECATED CALCIDIOL+CALCIFEROL SERPL-MC: 62 UG/L (ref 20–75)

## 2023-02-09 ENCOUNTER — TELEPHONE (OUTPATIENT)
Dept: INTERNAL MEDICINE | Facility: CLINIC | Age: 68
End: 2023-02-09
Payer: COMMERCIAL

## 2023-02-09 NOTE — TELEPHONE ENCOUNTER
Order/Referral Request    Who is requesting: Patient    Orders being requested: TDap Vaccine    Reason service is needed/diagnosis: Per provider at last appointment she wanted patient to get one done    When are orders needed by: ASAP    Has this been discussed with Provider: Yes    Does patient have a preference on a Group/Provider/Facility? [place]    Does patient have an appointment scheduled?: No    Where to send orders: Place orders within Epic    Could we send this information to you in [facility] or would you prefer to receive a phone call?:   Patient would prefer a phone call   Okay to leave a detailed message?: Yes at Cell number on file:    Telephone Information:   Mobile [phone]

## 2023-02-13 ENCOUNTER — TRANSFERRED RECORDS (OUTPATIENT)
Dept: HEALTH INFORMATION MANAGEMENT | Facility: CLINIC | Age: 68
End: 2023-02-13

## 2023-02-16 NOTE — TELEPHONE ENCOUNTER
Left message per note that she should get this through her pharmacy, as Medicare will not pay for it in the clinic    Radha Crum CMA ([facility])

## 2023-04-11 ENCOUNTER — HOSPITAL ENCOUNTER (OUTPATIENT)
Dept: BONE DENSITY | Facility: HOSPITAL | Age: 68
Discharge: HOME | End: 2023-04-11
Attending: INTERNAL MEDICINE | Admitting: INTERNAL MEDICINE
Payer: COMMERCIAL

## 2023-04-11 DIAGNOSIS — M85.80 OSTEOPENIA, UNSPECIFIED LOCATION: ICD-10-CM

## 2023-04-11 DIAGNOSIS — M81.0 AGE-RELATED OSTEOPOROSIS WITHOUT CURRENT PATHOLOGICAL FRACTURE: ICD-10-CM

## 2023-04-11 PROCEDURE — 77080 DXA BONE DENSITY AXIAL: CPT

## 2023-09-20 ENCOUNTER — OFFICE VISIT (OUTPATIENT)
Dept: FAMILY MEDICINE | Facility: CLINIC | Age: 68
End: 2023-09-20
Payer: COMMERCIAL

## 2023-09-20 ENCOUNTER — NURSE TRIAGE (OUTPATIENT)
Dept: NURSING | Facility: CLINIC | Age: 68
End: 2023-09-20
Payer: COMMERCIAL

## 2023-09-20 VITALS
WEIGHT: 127.3 LBS | SYSTOLIC BLOOD PRESSURE: 137 MMHG | RESPIRATION RATE: 18 BRPM | OXYGEN SATURATION: 98 % | BODY MASS INDEX: 21.51 KG/M2 | HEART RATE: 68 BPM | TEMPERATURE: 98.1 F | DIASTOLIC BLOOD PRESSURE: 74 MMHG

## 2023-09-20 DIAGNOSIS — R07.0 THROAT PAIN: Primary | ICD-10-CM

## 2023-09-20 LAB
DEPRECATED S PYO AG THROAT QL EIA: NEGATIVE
GROUP A STREP BY PCR: NOT DETECTED

## 2023-09-20 PROCEDURE — 87635 SARS-COV-2 COVID-19 AMP PRB: CPT | Performed by: FAMILY MEDICINE

## 2023-09-20 PROCEDURE — 99213 OFFICE O/P EST LOW 20 MIN: CPT | Performed by: FAMILY MEDICINE

## 2023-09-20 PROCEDURE — 87651 STREP A DNA AMP PROBE: CPT | Performed by: FAMILY MEDICINE

## 2023-09-20 NOTE — PATIENT INSTRUCTIONS
Acetaminophen (Tylenol) 500mg tablets.  You may take 2 tabs every 4-6 hours as needed  Ibuprofen (Advil, Motrin) 200mg tablets.  You may take 3 tabs every 6-8 hours as needed with food.  Fluids   Rest

## 2023-09-20 NOTE — PROGRESS NOTES
Clinical Decision Making:    At the end of the encounter, I discussed results, diagnosis, medications. Discussed red flags for immediate return to clinic/ER, as well as indications for follow up if no improvement. Patient understood and agreed to plan. Patient was stable for discharge.      ICD-10-CM    1. Throat pain  R07.0 Streptococcus A Rapid Screen w/Reflex to PCR - Clinic Collect     Group A Streptococcus PCR Throat Swab     Symptomatic COVID-19 Virus (Coronavirus) by PCR Nose        COVID PCR pending  Strep PCR pending  Acetaminophen and ibuprofen as needed  Fluids and rest  Follow-up if not improving as anticipated      There are no Patient Instructions on file for this visit.   Return in about 1 week (around 9/27/2023), or if symptoms worsen or fail to improve.      chief complaint    HPI:  Skylar Spaulding is a 67 year old female who presents today complaining of throat pain on the left side since yesterday.  It is tender when she touches on the left side of her neck.  She has no sore throat when swallowing.  She has no mouth sores.  No tooth pain.  Her pain is not worse with sucking on throat lozenges or eating anything.  No fevers or chills, no ear pain or headache.  No cough or shortness of breath.  She does have a little bit of rhinitis  Her son-in-law was in the hospital this past weekend for meningitis but she did not see him for at least a week before he became ill.    History obtained from the patient.    Problem List:  2022-02: Infection due to 2019 novel coronavirus  2020-10: Health maintenance examination  2020-10: Healthcare maintenance  2015-11: Hyperlipidemia  2015-11: Postmenopausal  2015-11: Breast cancer (H)      Past Medical History:   Diagnosis Date    Benign positional vertigo     Breast cancer (H)     ER- ND+ carcinoma of breast (H)     Hyperlipidemia     Menopause     Microscopic hematuria     Osteoarthritis        Social History     Tobacco Use    Smoking status: Never    Smokeless  tobacco: Never   Substance Use Topics    Alcohol use: No       Review of systems  negative except listed in HPI    Vitals:    09/20/23 1545   BP: 137/74   BP Location: Right arm   Patient Position: Sitting   Cuff Size: Adult Regular   Pulse: 68   Resp: 18   Temp: 98.1  F (36.7  C)   TempSrc: Oral   SpO2: 98%   Weight: 57.7 kg (127 lb 4.8 oz)       Physical Exam  Vitals noted and within normal limits.  Patient is alert, oriented, and in no acute distress.  Eyes: Conjunctive not injected.  Ears: Canals patent, TMs intact, no erythema and no bulging.  Mouth: Mucous membranes pink and moist.  Pharynx is not erythematous.  Neck supple with no cervical lymphadenopathy.  Heart has a regular rate and rhythm with no murmurs.  Lungs are clear to auscultation bilaterally with good air entry.  No wheezes, rales, rhonchi.  Rapid test is negative  Results for orders placed or performed in visit on 09/20/23   Streptococcus A Rapid Screen w/Reflex to PCR - Clinic Collect     Status: Normal    Specimen: Throat; Swab   Result Value Ref Range    Group A Strep antigen Negative Negative

## 2023-09-20 NOTE — TELEPHONE ENCOUNTER
"Pt calling that for the past couple of days pt throat is a shooting pain and pt is seeing some red and white spots on the back of her throat.  No fever.  Pt would like to be seen.  Pt intends to go to [facility], [place] now.  Gris Baldwin RN  FNA Nurse Advisor    Reason for Disposition   [1] Sore throat is the only symptom AND [2] present > 48 hours    Additional Information   Negative: SEVERE difficulty breathing (e.g., struggling for each breath, speaks in single words, stridor)   Negative: Sounds like a life-threatening emergency to the triager   Negative: [1] Diagnosed strep throat AND [2] taking antibiotic AND [3] symptoms continue   Negative: Throat culture results, call about   Negative: Productive cough is main symptom   Negative: Non-productive cough is main symptom   Negative: Hoarseness is main symptom   Negative: Runny nose is main symptom   Negative: Uvula swelling is main symptom   Negative: [1] Drooling or spitting out saliva (because can't swallow) AND [2] normal breathing   Negative: Unable to open mouth completely   Negative: [1] Difficulty breathing AND [2] not severe   Negative: Fever > 104 F (40 C)   Negative: [1] Refuses to drink anything AND [2] for > 12 hours   Negative: [1] Drinking very little AND [2] dehydration suspected (e.g., no urine > 12 hours, very dry mouth, very lightheaded)   Negative: Patient sounds very sick or weak to the triager   Negative: SEVERE (e.g., excruciating) throat pain   Negative: [1] Pus on tonsils (back of throat) AND [2]  fever AND [3] swollen neck lymph nodes (\"glands\")   Negative: [1] Rash AND [2] widespread (especially chest and abdomen)   Negative: Earache also present   Negative: Fever present > 3 days (72 hours)   Negative: Diabetes mellitus or weak immune system (e.g., HIV positive, cancer chemo, splenectomy, organ transplant, chronic steroids)   Negative: History of rheumatic fever   Negative: [1] Adult is leaving on a trip AND [2] requests an " antibiotic NOW   Negative: [1] Positive throat culture or rapid strep test (according to lab, PCP, caller, etc.) AND [2] NO  standing order to call in prescription for antibiotic   Negative: [1] Exposure to family member (or spouse or boyfriend/girlfriend) with test-proven strep AND [2] within last 10 days    Protocols used: Sore Throat-A-AH

## 2023-09-21 LAB — SARS-COV-2 RNA RESP QL NAA+PROBE: NEGATIVE

## 2023-10-25 ENCOUNTER — MYC MEDICAL ADVICE (OUTPATIENT)
Dept: INTERNAL MEDICINE | Facility: CLINIC | Age: 68
End: 2023-10-25
Payer: COMMERCIAL

## 2023-10-26 ENCOUNTER — IMMUNIZATION (OUTPATIENT)
Dept: FAMILY MEDICINE | Facility: CLINIC | Age: 68
End: 2023-10-26
Payer: COMMERCIAL

## 2023-10-26 ENCOUNTER — OFFICE VISIT (OUTPATIENT)
Dept: FAMILY MEDICINE | Facility: CLINIC | Age: 68
End: 2023-10-26
Payer: COMMERCIAL

## 2023-10-26 VITALS
OXYGEN SATURATION: 97 % | BODY MASS INDEX: 21.46 KG/M2 | SYSTOLIC BLOOD PRESSURE: 149 MMHG | HEART RATE: 62 BPM | WEIGHT: 127 LBS | DIASTOLIC BLOOD PRESSURE: 82 MMHG | RESPIRATION RATE: 14 BRPM | TEMPERATURE: 98.2 F

## 2023-10-26 DIAGNOSIS — K14.0 GLOSSITIS: Primary | ICD-10-CM

## 2023-10-26 LAB
DEPRECATED S PYO AG THROAT QL EIA: NEGATIVE
GROUP A STREP BY PCR: NOT DETECTED

## 2023-10-26 PROCEDURE — 99213 OFFICE O/P EST LOW 20 MIN: CPT | Performed by: FAMILY MEDICINE

## 2023-10-26 PROCEDURE — 87651 STREP A DNA AMP PROBE: CPT | Performed by: FAMILY MEDICINE

## 2023-10-26 PROCEDURE — G0008 ADMIN INFLUENZA VIRUS VAC: HCPCS

## 2023-10-26 PROCEDURE — 90662 IIV NO PRSV INCREASED AG IM: CPT

## 2023-10-26 NOTE — TELEPHONE ENCOUNTER
.  General Call    Contacts         Type Contact Phone/Fax    10/25/2023 07:23 PM CDT Phone (Incoming) Skylar Spaulding (Self) [phone] (M)          Reason for Call: Pt tongue is swollan with white dots. Pt spoke to Dr. Wray's care team earlier today (10/25) and was told to be seen. First appt isn't until the middle of November. If she can't get into urgent care in the morning, she'll wait for your call. Can you call pt if she can be seen sooner? Thank you!    Could we send this information to you in [place] or would you prefer to receive a phone call?:   Patient would prefer a phone call   Okay to leave a detailed message?: Yes at, [phone]

## 2023-10-26 NOTE — PROGRESS NOTES
Assessment & Plan     Glossitis  - Streptococcus A Rapid Screen w/Reflex to PCR - Clinic Collect       Tongue appears normal at this time. She has minimal features of what may appears as a resolving strawberry tongue where papillae are prominent on the tip of her tongue  Given her western diet unlikely to have B12 deficiency, no prior hx of gastrectomy/alma en Y/bypass    Quite possibly a mild allergic reaction, will advise 5 days of antihistamine.   We also discussed mild viral illnesses can cause brief episodes of glossitis, reassuring as her symptoms have improved    Review of meds does not suggest iatrogenic cause    Joshua Montano MD   [place] [facility]    Evita Cheng is a 67 year old female who presents to clinic today for the following health issues:  Chief Complaint   Patient presents with    Mouth Problem     X Sunday afternoon. Bright red strip on tongue. Pain when swallow. Tongue tender. White spots.     HPI    Patient note symptoms are about four days ago having a bright red strep on her tongue and appearing swollen this is significant improvement last few days. She has no throat pain at this time. Absent a fever. No lesions on her hand or feet. There is a moderate or white spots receiving her mouth with his is resolved. She is  to a dentist. She had a recent dental exam without any abnormalities including decayed teeth.     At times feels a burning/irritating sensation of the tongue. No exposures to HFMD.     Patient Active Problem List    Diagnosis Date Noted    Infection due to 2019 novel coronavirus 02/01/2022     Priority: Medium     Jan 2022       Health maintenance examination 10/07/2020     Priority: Medium     mammo annual ( piper breast cancer )   Colon has a tortuous colon and was told not to get it done again. Then had   colon enterography and then virtual colonoscopy 2020  ( had a horrible experience will do cologard   dexa 2018, 2021   Pap smear    pap done by gyn          Healthcare maintenance 10/07/2020     Priority: Medium     Formatting of this note might be different from the original.  mammo annual   Colon has a tortuous colon and was told not to get it done again. Then had colon enterography and then virtual colonoscopy 2020   dexa 2018   pap done by gyn      Hyperlipidemia 11/03/2015     Priority: Medium    Postmenopausal 11/03/2015     Priority: Medium    Breast cancer (H) 11/03/2015     Priority: Medium     2003           Current Outpatient Medications   Medication    aspirin 81 MG EC tablet    atenolol (TENORMIN) 25 MG tablet    calcium carbonate-vitamin D2 500 mg(1,250mg) -200 unit tablet    cholecalciferol, vitamin D3, (VITAMIN D3) 2,000 unit cap    levothyroxine (SYNTHROID/LEVOTHROID) 50 MCG tablet    multivitamin therapeutic (THERAGRAN) tablet    rosuvastatin (CRESTOR) 5 MG tablet    Saccharomyces boulardii (FLORASTOR) 250 mg capsule     No current facility-administered medications for this visit.           Objective    BP (!) 149/82   Pulse 62   Temp 98.2  F (36.8  C) (Oral)   Resp 14   Wt 57.6 kg (127 lb)   SpO2 97%   BMI 21.46 kg/m    Physical Exam         Pulm: non-labored  Neck no masses or lymphadenopathy  GEN: NAD  Mouth: no swelling of tongue, no lesions of gums, no trismus, no enlarged tonsils    No results found for any visits on 10/26/23.                  The use of Dragon/Dextr dictation services may have been used to construct the content in this note; any grammatical or spelling errors are non-intentional. Please contact the author of this note directly if you are in need of any clarification.

## 2023-10-26 NOTE — PATIENT INSTRUCTIONS
Take an antihistamine such as cetirizine/loratadine or fexofenadine for the next 5 days      If you develop new fever, rash, tongue swelling or  worsening symptoms please seek help right away

## 2023-12-19 ENCOUNTER — LAB REQUISITION (OUTPATIENT)
Dept: LAB | Facility: CLINIC | Age: 68
End: 2023-12-19
Payer: COMMERCIAL

## 2023-12-19 DIAGNOSIS — Z12.4 ENCOUNTER FOR SCREENING FOR MALIGNANT NEOPLASM OF CERVIX: ICD-10-CM

## 2023-12-19 PROCEDURE — 87624 HPV HI-RISK TYP POOLED RSLT: CPT | Mod: ORL | Performed by: OBSTETRICS & GYNECOLOGY

## 2023-12-19 PROCEDURE — G0123 SCREEN CERV/VAG THIN LAYER: HCPCS | Mod: ORL | Performed by: OBSTETRICS & GYNECOLOGY

## 2023-12-21 ENCOUNTER — TELEPHONE (OUTPATIENT)
Dept: INTERNAL MEDICINE | Facility: CLINIC | Age: 68
End: 2023-12-21
Payer: COMMERCIAL

## 2023-12-21 NOTE — TELEPHONE ENCOUNTER
December 21, 2023    Outside records received from [facility].  Records were placed in the inbox for Dr. Wray to review.  A copy was sent to HIM to be scanned into the patient's chart.    Rita Francis

## 2024-01-23 DIAGNOSIS — E78.5 HYPERLIPIDEMIA, UNSPECIFIED HYPERLIPIDEMIA TYPE: ICD-10-CM

## 2024-01-23 DIAGNOSIS — E06.3 HYPOTHYROIDISM DUE TO HASHIMOTO'S THYROIDITIS: ICD-10-CM

## 2024-01-23 RX ORDER — LEVOTHYROXINE SODIUM 50 UG/1
TABLET ORAL
Qty: 90 TABLET | Refills: 0 | Status: SHIPPED | OUTPATIENT
Start: 2024-01-23 | End: 2024-02-05

## 2024-01-23 RX ORDER — ROSUVASTATIN CALCIUM 5 MG/1
5 TABLET, COATED ORAL DAILY
Qty: 90 TABLET | Refills: 0 | Status: SHIPPED | OUTPATIENT
Start: 2024-01-23 | End: 2024-02-05

## 2024-02-02 SDOH — HEALTH STABILITY: PHYSICAL HEALTH: ON AVERAGE, HOW MANY DAYS PER WEEK DO YOU ENGAGE IN MODERATE TO STRENUOUS EXERCISE (LIKE A BRISK WALK)?: 3 DAYS

## 2024-02-02 SDOH — HEALTH STABILITY: PHYSICAL HEALTH: ON AVERAGE, HOW MANY MINUTES DO YOU ENGAGE IN EXERCISE AT THIS LEVEL?: 30 MIN

## 2024-02-02 ASSESSMENT — SOCIAL DETERMINANTS OF HEALTH (SDOH): HOW OFTEN DO YOU GET TOGETHER WITH FRIENDS OR RELATIVES?: TWICE A WEEK

## 2024-02-05 ENCOUNTER — OFFICE VISIT (OUTPATIENT)
Dept: INTERNAL MEDICINE | Facility: CLINIC | Age: 69
End: 2024-02-05
Payer: COMMERCIAL

## 2024-02-05 VITALS
TEMPERATURE: 97.8 F | HEIGHT: 65 IN | DIASTOLIC BLOOD PRESSURE: 79 MMHG | OXYGEN SATURATION: 98 % | BODY MASS INDEX: 20.66 KG/M2 | HEART RATE: 54 BPM | WEIGHT: 124 LBS | RESPIRATION RATE: 11 BRPM | SYSTOLIC BLOOD PRESSURE: 132 MMHG

## 2024-02-05 DIAGNOSIS — C50.912 MALIGNANT NEOPLASM OF LEFT BREAST IN FEMALE, ESTROGEN RECEPTOR NEGATIVE, UNSPECIFIED SITE OF BREAST (H): ICD-10-CM

## 2024-02-05 DIAGNOSIS — G89.29 CHRONIC LEFT-SIDED LOW BACK PAIN WITH RIGHT-SIDED SCIATICA: ICD-10-CM

## 2024-02-05 DIAGNOSIS — E78.2 MIXED HYPERLIPIDEMIA: ICD-10-CM

## 2024-02-05 DIAGNOSIS — E06.3 HYPOTHYROIDISM DUE TO HASHIMOTO'S THYROIDITIS: Primary | ICD-10-CM

## 2024-02-05 DIAGNOSIS — R31.29 MICROSCOPIC HEMATURIA: ICD-10-CM

## 2024-02-05 DIAGNOSIS — Z23 NEED FOR TDAP VACCINATION: ICD-10-CM

## 2024-02-05 DIAGNOSIS — Z00.00 HEALTHCARE MAINTENANCE: ICD-10-CM

## 2024-02-05 DIAGNOSIS — M54.41 CHRONIC LEFT-SIDED LOW BACK PAIN WITH RIGHT-SIDED SCIATICA: ICD-10-CM

## 2024-02-05 DIAGNOSIS — Z00.00 ENCOUNTER FOR MEDICARE ANNUAL WELLNESS EXAM: ICD-10-CM

## 2024-02-05 DIAGNOSIS — Z17.1 MALIGNANT NEOPLASM OF LEFT BREAST IN FEMALE, ESTROGEN RECEPTOR NEGATIVE, UNSPECIFIED SITE OF BREAST (H): ICD-10-CM

## 2024-02-05 DIAGNOSIS — Z29.11 NEED FOR VACCINATION AGAINST RESPIRATORY SYNCYTIAL VIRUS: ICD-10-CM

## 2024-02-05 DIAGNOSIS — R79.9 ABNORMAL FINDING OF BLOOD CHEMISTRY, UNSPECIFIED: ICD-10-CM

## 2024-02-05 DIAGNOSIS — R10.32 ABDOMINAL PAIN, LEFT LOWER QUADRANT: ICD-10-CM

## 2024-02-05 DIAGNOSIS — E55.9 VITAMIN D DEFICIENCY: ICD-10-CM

## 2024-02-05 DIAGNOSIS — E78.5 HYPERLIPIDEMIA, UNSPECIFIED HYPERLIPIDEMIA TYPE: ICD-10-CM

## 2024-02-05 LAB
ALBUMIN SERPL BCG-MCNC: 4.4 G/DL (ref 3.5–5.2)
ALBUMIN UR-MCNC: NEGATIVE MG/DL
ALP SERPL-CCNC: 55 U/L (ref 40–150)
ALT SERPL W P-5'-P-CCNC: 20 U/L (ref 0–50)
ANION GAP SERPL CALCULATED.3IONS-SCNC: 9 MMOL/L (ref 7–15)
APPEARANCE UR: CLEAR
AST SERPL W P-5'-P-CCNC: 27 U/L (ref 0–45)
BACTERIA #/AREA URNS HPF: ABNORMAL /HPF
BILIRUB SERPL-MCNC: 0.6 MG/DL
BILIRUB UR QL STRIP: NEGATIVE
BUN SERPL-MCNC: 10.1 MG/DL (ref 8–23)
CALCIUM SERPL-MCNC: 9.7 MG/DL (ref 8.8–10.2)
CHLORIDE SERPL-SCNC: 102 MMOL/L (ref 98–107)
CHOLEST SERPL-MCNC: 209 MG/DL
COLOR UR AUTO: YELLOW
CREAT SERPL-MCNC: 0.91 MG/DL (ref 0.51–0.95)
DEPRECATED HCO3 PLAS-SCNC: 28 MMOL/L (ref 22–29)
EGFRCR SERPLBLD CKD-EPI 2021: 69 ML/MIN/1.73M2
ERYTHROCYTE [DISTWIDTH] IN BLOOD BY AUTOMATED COUNT: 12.5 % (ref 10–15)
FASTING STATUS PATIENT QL REPORTED: ABNORMAL
GLUCOSE SERPL-MCNC: 98 MG/DL (ref 70–99)
GLUCOSE UR STRIP-MCNC: NEGATIVE MG/DL
HBA1C MFR BLD: 6 % (ref 0–5.6)
HCT VFR BLD AUTO: 41.1 % (ref 35–47)
HDLC SERPL-MCNC: 63 MG/DL
HGB BLD-MCNC: 13.1 G/DL (ref 11.7–15.7)
HGB UR QL STRIP: ABNORMAL
KETONES UR STRIP-MCNC: NEGATIVE MG/DL
LDLC SERPL CALC-MCNC: 126 MG/DL
LEUKOCYTE ESTERASE UR QL STRIP: NEGATIVE
MCH RBC QN AUTO: 29.1 PG (ref 26.5–33)
MCHC RBC AUTO-ENTMCNC: 31.9 G/DL (ref 31.5–36.5)
MCV RBC AUTO: 91 FL (ref 78–100)
NITRATE UR QL: NEGATIVE
NONHDLC SERPL-MCNC: 146 MG/DL
PH UR STRIP: 5.5 [PH] (ref 5–8)
PLATELET # BLD AUTO: 230 10E3/UL (ref 150–450)
POTASSIUM SERPL-SCNC: 4.2 MMOL/L (ref 3.4–5.3)
PROT SERPL-MCNC: 7.1 G/DL (ref 6.4–8.3)
RBC # BLD AUTO: 4.5 10E6/UL (ref 3.8–5.2)
RBC #/AREA URNS AUTO: ABNORMAL /HPF
SODIUM SERPL-SCNC: 139 MMOL/L (ref 135–145)
SP GR UR STRIP: 1.02 (ref 1–1.03)
SQUAMOUS #/AREA URNS AUTO: ABNORMAL /LPF
TRIGL SERPL-MCNC: 100 MG/DL
TSH SERPL DL<=0.005 MIU/L-ACNC: 1.36 UIU/ML (ref 0.3–4.2)
UROBILINOGEN UR STRIP-ACNC: 0.2 E.U./DL
VIT D+METAB SERPL-MCNC: 63 NG/ML (ref 20–50)
WBC # BLD AUTO: 5.4 10E3/UL (ref 4–11)
WBC #/AREA URNS AUTO: ABNORMAL /HPF

## 2024-02-05 PROCEDURE — 84443 ASSAY THYROID STIM HORMONE: CPT | Performed by: INTERNAL MEDICINE

## 2024-02-05 PROCEDURE — 80061 LIPID PANEL: CPT | Performed by: INTERNAL MEDICINE

## 2024-02-05 PROCEDURE — G0439 PPPS, SUBSEQ VISIT: HCPCS | Performed by: INTERNAL MEDICINE

## 2024-02-05 PROCEDURE — 99214 OFFICE O/P EST MOD 30 MIN: CPT | Mod: 25 | Performed by: INTERNAL MEDICINE

## 2024-02-05 PROCEDURE — 90715 TDAP VACCINE 7 YRS/> IM: CPT | Performed by: INTERNAL MEDICINE

## 2024-02-05 PROCEDURE — 90471 IMMUNIZATION ADMIN: CPT | Performed by: INTERNAL MEDICINE

## 2024-02-05 PROCEDURE — 36415 COLL VENOUS BLD VENIPUNCTURE: CPT | Performed by: INTERNAL MEDICINE

## 2024-02-05 PROCEDURE — 83036 HEMOGLOBIN GLYCOSYLATED A1C: CPT | Performed by: INTERNAL MEDICINE

## 2024-02-05 PROCEDURE — 82306 VITAMIN D 25 HYDROXY: CPT | Performed by: INTERNAL MEDICINE

## 2024-02-05 PROCEDURE — 80053 COMPREHEN METABOLIC PANEL: CPT | Performed by: INTERNAL MEDICINE

## 2024-02-05 PROCEDURE — 81001 URINALYSIS AUTO W/SCOPE: CPT | Performed by: INTERNAL MEDICINE

## 2024-02-05 PROCEDURE — 85027 COMPLETE CBC AUTOMATED: CPT | Performed by: INTERNAL MEDICINE

## 2024-02-05 RX ORDER — RESPIRATORY SYNCYTIAL VIRUS VACCINE 120MCG/0.5
0.5 KIT INTRAMUSCULAR ONCE
Qty: 1 EACH | Refills: 0 | Status: CANCELLED | OUTPATIENT
Start: 2024-02-05 | End: 2024-02-05

## 2024-02-05 RX ORDER — ATENOLOL 25 MG/1
12.5 TABLET ORAL DAILY
Qty: 90 TABLET | Refills: 3 | Status: SHIPPED | OUTPATIENT
Start: 2024-02-05

## 2024-02-05 RX ORDER — LEVOTHYROXINE SODIUM 50 UG/1
TABLET ORAL
Qty: 90 TABLET | Refills: 3 | Status: SHIPPED | OUTPATIENT
Start: 2024-02-05

## 2024-02-05 RX ORDER — ROSUVASTATIN CALCIUM 5 MG/1
5 TABLET, COATED ORAL DAILY
Qty: 90 TABLET | Refills: 3 | Status: SHIPPED | OUTPATIENT
Start: 2024-02-05

## 2024-02-05 ASSESSMENT — ENCOUNTER SYMPTOMS: BACK PAIN: 1

## 2024-02-05 NOTE — PROGRESS NOTES
Preventive Care Visit  [facility]  Palmira Wray MD, Internal Medicine  Feb 5, 2024    Assessment & Plan     Need for Tdap vaccination  Will get today     Need for vaccination against respiratory syncytial virus  Discussed     Hypothyroidism due to Hashimoto's thyroiditis  TSH   Date Value Ref Range Status   02/02/2023 1.76 0.30 - 4.20 uIU/mL Final   11/29/2021 1.59 0.30 - 5.00 uIU/mL Final     Stable takes every day   - TSH WITH FREE T4 REFLEX; Future  - levothyroxine (SYNTHROID/LEVOTHROID) 50 MCG tablet; TAKE 1 TABLET(50 MCG) BY MOUTH DAILY  - TSH WITH FREE T4 REFLEX    Mixed hyperlipidemia  On low dose due to h/o intolerance / coronary calcium score is zero 2020   - atenolol (TENORMIN) 25 MG tablet; Take 0.5 tablets (12.5 mg) by mouth daily    Hyperlipidemia, unspecified hyperlipidemia type    - rosuvastatin (CRESTOR) 5 MG tablet; Take 1 tablet (5 mg) by mouth daily  - CBC with platelets; Future  - TSH; Future  - Hemoglobin A1c; Future  - Vitamin D Deficiency; Future  - Comprehensive metabolic panel; Future  - Lipid panel reflex to direct LDL Fasting; Future  - Lipid panel reflex to direct LDL Fasting  - Comprehensive metabolic panel  - Vitamin D Deficiency  - Hemoglobin A1c  - CBC with platelets    Malignant neoplasm of left breast in female, estrogen receptor negative, unspecified site of breast (H)  In remission getting annual mammograms.    Healthcare maintenance  Bone density scan shows decline without osteoporosis.  It is up-to-date  Is getting pelvic and Pap smears with GYN.  Colonography CT done 2020 with will do Cologuard's from now on start 2025    Encounter for Medicare annual wellness exam      Chronic left-sided low back pain with right-sided sciatica  She has no clinical evidence of radiculopathy and pain is not intractable  Is doing stretches but may benefit from core strengthening exercises once she has more time to do repetitive sessions.  Did  her on the trajectory  of back pain.  She has not had any recent imaging but there are no red flags to merit urgent imaging.  If she still has low-grade pain in 8 to 12 weeks we could do MRI lumbar spine.  - Physical Therapy Referral; Future    Abnormal finding of blood chemistry, unspecified    - Hemoglobin A1c; Future  - Hemoglobin A1c    Vitamin D deficiency    - Vitamin D Deficiency; Future  - Vitamin D Deficiency    Abdominal pain, left lower quadrant  She did have an episode that of left lower groin pain that has now receded.  She was post to get an ultrasound done will order.  - US Pelvic Complete with Transvaginal; Future  - UA Macroscopic with reflex to Microscopic and Culture - Lab Collect; Future  - UA Macroscopic with reflex to Microscopic and Culture - Lab Collect  - UA Microscopic with Reflex to Culture  Overall in excellent health excellent lifestyle.  No concerns          Counseling  Appropriate preventive services were discussed with this patient, including applicable screening as appropriate for fall prevention, nutrition, physical activity, Tobacco-use cessation, weight loss and cognition.  Checklist reviewing preventive services available has been given to the patient.  Reviewed patient's diet, addressing concerns and/or questions.   She is at risk for lack of exercise and has been provided with information to increase physical activity for the benefit of her well-being.             Evita Cheng is a 68 year old, presenting for the following:  Wellness Visit (Fasting today ) and Back Pain (Possible PT referral for sciatic discomfort )        2/5/2024     8:46 AM   Additional Questions   Roomed by HERRERA Soto   Accompanied by Self         Health Care Directive  Patient does not have a Health Care Directive or Living Will: Patient states has Advance Directive and will bring in a copy to clinic.    Back Pain                   2/2/2024   General Health   How would you rate your overall physical health? Excellent    Feel stress (tense, anxious, or unable to sleep) Not at all         2/2/2024   Nutrition   Diet: Regular (no restrictions)         2/2/2024   Exercise   Days per week of moderate/strenous exercise 3 days   Average minutes spent exercising at this level 30 min         2/2/2024   Social Factors   Frequency of gathering with friends or relatives Twice a week   Worry food won't last until get money to buy more No   Food not last or not have enough money for food? No   Do you have housing?  Yes   Are you worried about losing your housing? No   Lack of transportation? No   Unable to get utilities (heat,electricity)? No         2/2/2024   Fall Risk   Fallen 2 or more times in the past year? No    No   Trouble with walking or balance? No          2/2/2024   Activities of Daily Living- Home Safety   Needs help with the following daily activites None of the above   Safety concerns in the home None of the above         2/2/2024   Dental   Dentist two times every year? Yes         2/2/2024   Hearing Screening   Hearing concerns? None of the above         2/2/2024   Driving Risk Screening   Patient/family members have concerns about driving No         2/2/2024   General Alertness/Fatigue Screening   Have you been more tired than usual lately? No         2/2/2024   Urinary Incontinence Screening   Bothered by leaking urine in past 6 months No         2/2/2024   TB Screening   Were you born outside of [place]?  No         Today's PHQ-2 Score:       2/5/2024     8:42 AM   PHQ-2 ( 1999 [employer])   Q1: Little interest or pleasure in doing things 0   Q2: Feeling down, depressed or hopeless 0   PHQ-2 Score 0   Q1: Little interest or pleasure in doing things Not at all   Q2: Feeling down, depressed or hopeless Not at all   PHQ-2 Score 0           2/2/2024   Substance Use   Alcohol more than 3/day or more than 7/wk Not Applicable   Do you have a current opioid prescription? No   How severe/bad is pain from 1 to 10? 0/10 (No Pain)   Do you use  any other substances recreationally? No     Social History     Tobacco Use    Smoking status: Never    Smokeless tobacco: Never   Vaping Use    Vaping Use: Never used   Substance Use Topics    Alcohol use: No    Drug use: No           2/1/2023   LAST FHS-7 RESULTS   1st degree relative breast or ovarian cancer Yes   Any relative bilateral breast cancer No   Any male have breast cancer No   Any woman have breast and ovarian cancer No   Any woman with breast cancer before 50yrs No   2 or more relatives with breast and/or ovarian cancer No   2 or more relatives with breast and/or bowel cancer Yes              History of abnormal Pap smear:         Latest Ref Rng & Units 12/19/2023     2:32 PM 12/19/2022     3:33 PM   PAP / HPV   PAP  Negative for Intraepithelial Lesion or Malignancy (NILM)  Negative for Intraepithelial Lesion or Malignancy (NILM)    HPV 16 DNA Negative Negative  Negative    HPV 18 DNA Negative Negative  Negative    Other HR HPV Negative Negative  Negative      The 10-year ASCVD risk score (Ron ART, et al., 2019) is: 7.2%    Values used to calculate the score:      Age: 68 years      Sex: Female      Is Non- : No      Diabetic: No      Tobacco smoker: No      Systolic Blood Pressure: 132 mmHg      Is BP treated: No      HDL Cholesterol: 61 mg/dL      Total Cholesterol: 208 mg/dL            Reviewed and updated as needed this visit by Provider                      Current providers sharing in care for this patient include:  Patient Care Team:  Palmira Wray MD as PCP - General (Internal Medicine)  Palmira Wray MD as Assigned PCP    The following health maintenance items are reviewed in Epic and correct as of today:  Health Maintenance   Topic Date Due    RSV VACCINE (Pregnancy & 60+) (1 - 1-dose 60+ series) Never done    DTAP/TDAP/TD IMMUNIZATION (5 - Td or Tdap) 10/31/2022    COVID-19 Vaccine (4 - 2023-24 season) 09/01/2023    ANNUAL REVIEW OF HM ORDERS  02/02/2024     "TSH W/FREE T4 REFLEX  02/02/2024    MEDICARE ANNUAL WELLNESS VISIT  12/19/2024    MAMMO SCREENING  12/19/2024    FALL RISK ASSESSMENT  02/05/2025    GLUCOSE  02/02/2026    LIPID  02/02/2028    ADVANCE CARE PLANNING  02/06/2028    COLORECTAL CANCER SCREENING  02/10/2030    DEXA  04/11/2038    HEPATITIS C SCREENING  Completed    PHQ-2 (once per calendar year)  Completed    INFLUENZA VACCINE  Completed    Pneumococcal Vaccine: 65+ Years  Completed    ZOSTER IMMUNIZATION  Completed    IPV IMMUNIZATION  Aged Out    HPV IMMUNIZATION  Aged Out    MENINGITIS IMMUNIZATION  Aged Out    RSV MONOCLONAL ANTIBODY  Aged Out    PAP  Discontinued     Review of Systems   Musculoskeletal:  Positive for back pain.     Current Outpatient Medications   Medication    aspirin 81 MG EC tablet    atenolol (TENORMIN) 25 MG tablet    calcium carbonate-vitamin D2 500 mg(1,250mg) -200 unit tablet    cholecalciferol, vitamin D3, (VITAMIN D3) 2,000 unit cap    levothyroxine (SYNTHROID/LEVOTHROID) 50 MCG tablet    multivitamin therapeutic (THERAGRAN) tablet    rosuvastatin (CRESTOR) 5 MG tablet    Saccharomyces boulardii (FLORASTOR) 250 mg capsule     No current facility-administered medications for this visit.          Objective    Exam  /79 (BP Location: Right arm, Patient Position: Sitting, Cuff Size: Adult Regular)   Pulse 54   Temp 97.8  F (36.6  C) (Tympanic)   Resp 11   Ht 1.638 m (5' 4.5\")   Wt 56.2 kg (124 lb)   LMP  (LMP Unknown)   SpO2 98%   BMI 20.96 kg/m     Estimated body mass index is 20.96 kg/m  as calculated from the following:    Height as of this encounter: 1.638 m (5' 4.5\").    Weight as of this encounter: 56.2 kg (124 lb).    Physical Exam  GENERAL: alert and no distress  EYES: Eyes grossly normal to inspection, PERRL and conjunctivae and sclerae normal  HENT: ear canals and TM's normal, nose and mouth without ulcers or lesions  NECK: no adenopathy, no asymmetry, masses, or scars  RESP: lungs clear to auscultation " - no rales, rhonchi or wheezes  CV: regular rate and rhythm, normal S1 S2, no S3 or S4, no murmur, click or rub, no peripheral edema  ABDOMEN: soft, nontender, no hepatosplenomegaly, no masses and bowel sounds normal  MS: no gross musculoskeletal defects noted, no edema  SKIN: no suspicious lesions or rashes  NEURO: Normal strength and tone, mentation intact and speech normal  PSYCH: mentation appears normal, affect normal/bright  LYMPH: no cervical, supraclavicular, axillary, or inguinal adenopathy         No data to display                Signed Electronically by: Palmira Wray MD

## 2024-02-05 NOTE — PATIENT INSTRUCTIONS
"Eating Healthy Foods: Care Instructions  With every meal, you can make healthy food choices. Try to eat a variety of fruits, vegetables, whole grains, lean proteins, and low-fat dairy products. This can help you get the right balance of nutrients, including vitamins and minerals. Small changes add up over time. You can start by adding one healthy food to your meals each day.    Try to make half your plate fruits and vegetables, one-fourth whole grains, and one-fourth lean proteins. Try including dairy with your meals.   Eat more fruits and vegetables. Try to have them with most meals and snacks.   Foods for healthy eating    Fruits    These can be fresh, frozen, canned, or dried.  Try to choose whole fruit rather than fruit juice.  Eat a variety of colors.    Vegetables    These can be fresh, frozen, canned, or dried.  Beans, peas, and lentils count too.    Whole grains    Choose whole-grain breads, cereals, and noodles.  Try brown rice.    Lean proteins    These can include lean meat, poultry, fish, and eggs.  You can also have tofu, beans, peas, lentils, nuts, and seeds.    Dairy    Try milk, yogurt, and cheese.  Choose low-fat or fat-free when you can.  If you need to, use lactose-free milk or fortified plant-based milk products, such as soy milk.    Water    Drink water when you're thirsty.  Limit sugar-sweetened drinks, including soda, fruit drinks, and sports drinks.  Where can you learn more?  Go to https://www.[employer].net/patiented  Enter T756 in the search box to learn more about \"Eating Healthy Foods: Care Instructions.\"  Current as of: February 28, 2023               Content Version: 13.8    9269-9058 [employer].   Care instructions adapted under license by your healthcare professional. If you have questions about a medical condition or this instruction, always ask your healthcare professional. [employer] disclaims any warranty or liability for your use of this " information.      Nutrition for Older Adults: Care Instructions  Overview     Good nutrition is important at any age. But it is especially important for older adults. Eating a healthy diet helps keep your body strong. And it can help lower your risk for disease.  As you get older, your body needs more of certain nutrients. These include vitamin B12, calcium, and vitamin D. But it may be harder for you to get these and other important nutrients. This could be for many reasons. You may not feel as hungry as you used to. Or you could have problems with your teeth or mouth that make it hard to chew. Or you may not enjoy planning and preparing meals, especially if you live alone.  Talk with your doctor if you want help getting the most nutrition from what you eat. The doctor may have you work with a dietitian to help you plan meals.  Follow-up care is a key part of your treatment and safety. Be sure to make and go to all appointments, and call your doctor if you are having problems. It's also a good idea to know your test results and keep a list of the medicines you take.  How can you care for yourself at home?  To stay healthy  Eat a variety of foods. The more you vary the foods you eat, the more vitamins, minerals, and other nutrients you get.  Take a multivitamin every day. Choose one with about 100% of the daily value (DV) for vitamins and minerals. Do not take more than 100% of the daily value for any vitamin or mineral unless your doctor tells you to. Talk with your doctor if you are not sure which multivitamin is right for you.  Eat lots of fruits and vegetables. Fresh, frozen, or no-salt canned vegetables and fruits in their own juice or light syrup are good choices.  Include foods that are high in vitamin B12 in your diet. Good choices are fortified breakfast cereal, nonfat or low-fat milk and other dairy products, meat, poultry, fish, and eggs.  Get enough calcium and vitamin D. Good choices include nonfat or  low-fat milk, cheese, and yogurt. Other good options are tofu, orange juice with added calcium, and some leafy green vegetables, such as ajit greens and kale. If you don't use milk products, talk to your doctor about calcium and vitamin D supplements.  Eat protein foods every day. Good choices include lean meat, fish, poultry, eggs, and cheese. Other good options are cooked beans, peanut butter, and nuts and seeds.  Choose whole grains for half of the grains you eat. Look for 100% whole wheat bread, whole-grain cereals, brown rice, and other whole grains.  If you have constipation  Eat high-fiber foods every day. These include fruits, vegetables, cooked dried beans, and whole grains.  Drink plenty of fluids. If you have kidney, heart, or liver disease and have to limit fluids, talk with your doctor before you increase the amount of fluids you drink.  Ask your doctor if stool softeners may help keep your bowels regular.  If you have mouth problems that make chewing hard  Pick canned or cooked fruits and vegetables. These are often softer.  Chop or shred meat, poultry, and fish. Add sauce or gravy to the meat to help keep it moist.  Pick other protein foods that are soft. These include cheese, peanut butter, cooked beans, cottage cheese, and eggs.  If you have trouble shopping for yourself  Ask a local food store to deliver groceries to your home.  Contact a volunteer center and ask for help.  Ask a family member or neighbor to help you.  If you have trouble preparing meals  If you are able, take a cooking class.  Use a microwave oven to cook TV dinners and other frozen or prepared foods.  Take part in group meal programs. You can find these through senior citizen programs.  Have meals brought to your home. Your community may offer programs that deliver meals, such as Meals on Wheels.  If your appetite is poor  Try eating smaller amounts of food more often. For example, eat 4 or 5 small meals a day instead of 1 or  "2 large meals.  Eat with family and friends. Or take part in group meal programs offered through volunteer programs. Eating with others may help your appetite. And it helps you be more social.  Ask your doctor if your medicines could cause appetite or taste problems. If so, ask about changing medicines.  Add spices and herbs to increase the flavor of food.  If you think you are depressed, ask your doctor for help. Depression can affect your appetite. And it can make it hard to do everyday activities like grocery shopping and making meals. Treatment can help.  When should you call for help?  Watch closely for changes in your health, and be sure to contact your doctor if you have any problems.  Where can you learn more?  Go to https://www.[employer].net/patiented  Enter L643 in the search box to learn more about \"Nutrition for Older Adults: Care Instructions.\"  Current as of: February 26, 2023               Content Version: 13.8    2540-8093 [employer].   Care instructions adapted under license by your healthcare professional. If you have questions about a medical condition or this instruction, always ask your healthcare professional. [employer] disclaims any warranty or liability for your use of this information.      Preventive Care Advice   This is general advice given by our system to help you stay healthy. However, your care team may have specific advice just for you. Please talk to your care team about your preventive care needs.  Nutrition  Eat 5 or more servings of fruits and vegetables each day.  Try wheat bread, brown rice and whole grain pasta (instead of white bread, rice, and pasta).  Get enough calcium and vitamin D. Check the label on foods and aim for 100% of the RDA (recommended daily allowance).  Lifestyle  Exercise at least 150 minutes each week  (30 minutes a day, 5 days a week).  Do muscle strengthening activities 2 days a week. These help control your weight and " prevent disease.  No smoking.  Wear sunscreen to prevent skin cancer.  Have a dental exam and cleaning every 6 months.  Yearly exams  See your health care team every year to talk about:  Any changes in your health.  Any medicines your care team has prescribed.  Preventive care, family planning, and ways to prevent chronic diseases.  Shots (vaccines)   HPV shots (up to age 26), if you've never had them before.  Hepatitis B shots (up to age 59), if you've never had them before.  COVID-19 shot: Get this shot when it's due.  Flu shot: Get a flu shot every year.  Tetanus shot: Get a tetanus shot every 10 years.  Pneumococcal, hepatitis A, and RSV shots: Ask your care team if you need these based on your risk.  Shingles shot (for age 50 and up)  General health tests  Diabetes screening:  Starting at age 35, Get screened for diabetes at least every 3 years.  If you are younger than age 35, ask your care team if you should be screened for diabetes.  Cholesterol test: At age 39, start having a cholesterol test every 5 years, or more often if advised.  Bone density scan (DEXA): At age 50, ask your care team if you should have this scan for osteoporosis (brittle bones).  Hepatitis C: Get tested at least once in your life.  STIs (sexually transmitted infections)  Before age 24: Ask your care team if you should be screened for STIs.  After age 24: Get screened for STIs if you're at risk. You are at risk for STIs (including HIV) if:  You are sexually active with more than one person.  You don't use condoms every time.  You or a partner was diagnosed with a sexually transmitted infection.  If you are at risk for HIV, ask about PrEP medicine to prevent HIV.  Get tested for HIV at least once in your life, whether you are at risk for HIV or not.  Cancer screening tests  Cervical cancer screening: If you have a cervix, begin getting regular cervical cancer screening tests starting at age 21.  Breast cancer scan (mammogram): If you've  ever had breasts, begin having regular mammograms starting at age 40. This is a scan to check for breast cancer.  Colon cancer screening: It is important to start screening for colon cancer at age 45.  Have a colonoscopy test every 10 years (or more often if you're at risk) Or, ask your provider about stool tests like a FIT test every year or Cologuard test every 3 years.  To learn more about your testing options, visit:   https://[email or web address]/410863.pdf.  For help making a decision, visit:   https://bit.ly/la04141.  Prostate cancer screening test: If you have a prostate, ask your care team if a prostate cancer screening test (PSA) at age 55 is right for you.  Lung cancer screening: If you are a current or former smoker ages 50 to 80, ask your care team if ongoing lung cancer screenings are right for you.  For informational purposes only. Not to replace the advice of your health care provider. Copyright   2023 [facility]. All rights reserved. Clinically reviewed by the [facility] Transitions Program. [employer] [id number] - REV 01/24.    Learning About Being Active as an Older Adult  Why is being active important as you get older?     Being active is one of the best things you can do for your health. And it's never too late to start. Being active--or getting active, if you aren't already--has definite benefits. It can:  Give you more energy,  Keep your mind sharp.  Improve balance to reduce your risk of falls.  Help you manage chronic illness with fewer medicines.  No matter how old you are, how fit you are, or what health problems you have, there is a form of activity that will work for you. And the more physical activity you can do, the better your overall health will be.  What kinds of activity can help you stay healthy?  Being more active will make your daily activities easier. Physical activity includes planned exercise and things you do in daily life. There are four types of  activity:  Aerobic.  Doing aerobic activity makes your heart and lungs strong.  Includes walking, dancing, and gardening.  Aim for at least 2  hours spread throughout the week.  It improves your energy and can help you sleep better.  Muscle-strengthening.  This type of activity can help maintain muscle and strengthen bones.  Includes climbing stairs, using resistance bands, and lifting or carrying heavy loads.  Aim for at least twice a week.  It can help protect the knees and other joints.  Stretching.  Stretching gives you better range of motion in joints and muscles.  Includes upper arm stretches, calf stretches, and gentle yoga.  Aim for at least twice a week, preferably after your muscles are warmed up from other activities.  It can help you function better in daily life.  Balancing.  This helps you stay coordinated and have good posture.  Includes heel-to-toe walking, vamshi chi, and certain types of yoga.  Aim for at least 3 days a week.  It can reduce your risk of falling.  Even if you have a hard time meeting the recommendations, it's better to be more active than less active. All activity done in each category counts toward your weekly total. You'd be surprised how daily things like carrying groceries, keeping up with grandchildren, and taking the stairs can add up.  What keeps you from being active?  If you've had a hard time being more active, you're not alone. Maybe you remember being able to do more. Or maybe you've never thought of yourself as being active. It's frustrating when you can't do the things you want. Being more active can help. What's holding you back?  Getting started.  Have a goal, but break it into easy tasks. Small steps build into big accomplishments.  Staying motivated.  If you feel like skipping your activity, remember your goal. Maybe you want to move better and stay independent. Every activity gets you one step closer.  Not feeling your best.  Start with 5 minutes of an activity you  "enjoy. Prove to yourself you can do it. As you get comfortable, increase your time.  You may not be where you want to be. But you're in the process of getting there. Everyone starts somewhere.  How can you find safe ways to stay active?  Talk with your doctor about any physical challenges you're facing. Make a plan with your doctor if you have a health problem or aren't sure how to get started with activity.  If you're already active, ask your doctor if there is anything you should change to stay safe as your body and health change.  If you tend to feel dizzy after you take medicine, avoid activity at that time. Try being active before you take your medicine. This will reduce your risk of falls.  If you plan to be active at home, make sure to clear your space before you get started. Remove things like TV cords, coffee tables, and throw rugs. It's safest to have plenty of space to move freely.  The key to getting more active is to take it slow and steady. Try to improve only a little bit at a time. Pick just one area to improve on at first. And if an activity hurts, stop and talk to your doctor.  Where can you learn more?  Go to https://www.[employer]atter.com.net/patiented  Enter P600 in the search box to learn more about \"Learning About Being Active as an Older Adult.\"  Current as of: June 6, 2023               Content Version: 13.8    9538-0075 [employer].   Care instructions adapted under license by your healthcare professional. If you have questions about a medical condition or this instruction, always ask your healthcare professional. [employer] disclaims any warranty or liability for your use of this information.      "

## 2024-02-08 ENCOUNTER — HOSPITAL ENCOUNTER (OUTPATIENT)
Dept: ULTRASOUND IMAGING | Facility: HOSPITAL | Age: 69
Discharge: HOME | End: 2024-02-08
Attending: INTERNAL MEDICINE | Admitting: INTERNAL MEDICINE
Payer: COMMERCIAL

## 2024-02-08 DIAGNOSIS — R10.32 ABDOMINAL PAIN, LEFT LOWER QUADRANT: ICD-10-CM

## 2024-02-08 PROCEDURE — 76856 US EXAM PELVIC COMPLETE: CPT

## 2024-02-08 PROCEDURE — 76830 TRANSVAGINAL US NON-OB: CPT

## 2024-03-19 DIAGNOSIS — E78.2 MIXED HYPERLIPIDEMIA: ICD-10-CM

## 2024-03-19 RX ORDER — ATENOLOL 25 MG/1
12.5 TABLET ORAL DAILY
Qty: 90 TABLET | Refills: 3 | OUTPATIENT
Start: 2024-03-19

## 2024-10-01 ENCOUNTER — ORDERS ONLY (AUTO-RELEASED) (OUTPATIENT)
Dept: INTERNAL MEDICINE | Facility: CLINIC | Age: 69
End: 2024-10-01

## 2024-10-01 ENCOUNTER — OFFICE VISIT (OUTPATIENT)
Dept: INTERNAL MEDICINE | Facility: CLINIC | Age: 69
End: 2024-10-01
Payer: COMMERCIAL

## 2024-10-01 ENCOUNTER — IMMUNIZATION (OUTPATIENT)
Dept: FAMILY MEDICINE | Facility: CLINIC | Age: 69
End: 2024-10-01
Payer: COMMERCIAL

## 2024-10-01 VITALS
OXYGEN SATURATION: 99 % | RESPIRATION RATE: 10 BRPM | HEIGHT: 64 IN | TEMPERATURE: 98.1 F | WEIGHT: 134.1 LBS | SYSTOLIC BLOOD PRESSURE: 151 MMHG | DIASTOLIC BLOOD PRESSURE: 86 MMHG | HEART RATE: 62 BPM | BODY MASS INDEX: 22.9 KG/M2

## 2024-10-01 DIAGNOSIS — Z12.11 ENCOUNTER FOR SCREENING FOR MALIGNANT NEOPLASM OF COLON: ICD-10-CM

## 2024-10-01 DIAGNOSIS — I10 ESSENTIAL HYPERTENSION: Primary | ICD-10-CM

## 2024-10-01 DIAGNOSIS — R10.32 ABDOMINAL PAIN, LEFT LOWER QUADRANT: ICD-10-CM

## 2024-10-01 LAB
ALBUMIN UR-MCNC: NEGATIVE MG/DL
APPEARANCE UR: CLEAR
BACTERIA #/AREA URNS HPF: ABNORMAL /HPF
BILIRUB UR QL STRIP: NEGATIVE
CLUE CELLS: ABNORMAL
COLOR UR AUTO: YELLOW
ERYTHROCYTE [DISTWIDTH] IN BLOOD BY AUTOMATED COUNT: 12.6 % (ref 10–15)
GLUCOSE UR STRIP-MCNC: NEGATIVE MG/DL
HCT VFR BLD AUTO: 42.4 % (ref 35–47)
HGB BLD-MCNC: 13.6 G/DL (ref 11.7–15.7)
HGB UR QL STRIP: ABNORMAL
KETONES UR STRIP-MCNC: NEGATIVE MG/DL
LEUKOCYTE ESTERASE UR QL STRIP: NEGATIVE
MCH RBC QN AUTO: 29.2 PG (ref 26.5–33)
MCHC RBC AUTO-ENTMCNC: 32.1 G/DL (ref 31.5–36.5)
MCV RBC AUTO: 91 FL (ref 78–100)
MUCOUS THREADS #/AREA URNS LPF: PRESENT /LPF
NITRATE UR QL: NEGATIVE
PH UR STRIP: 7 [PH] (ref 5–8)
PLATELET # BLD AUTO: 238 10E3/UL (ref 150–450)
RBC # BLD AUTO: 4.66 10E6/UL (ref 3.8–5.2)
RBC #/AREA URNS AUTO: ABNORMAL /HPF
SP GR UR STRIP: 1.01 (ref 1–1.03)
TRICHOMONAS, WET PREP: ABNORMAL
UROBILINOGEN UR STRIP-ACNC: 0.2 E.U./DL
WBC # BLD AUTO: 6.9 10E3/UL (ref 4–11)
WBC #/AREA URNS AUTO: ABNORMAL /HPF
WBC'S/HIGH POWER FIELD, WET PREP: ABNORMAL
YEAST, WET PREP: ABNORMAL

## 2024-10-01 PROCEDURE — 99214 OFFICE O/P EST MOD 30 MIN: CPT | Performed by: INTERNAL MEDICINE

## 2024-10-01 PROCEDURE — 87210 SMEAR WET MOUNT SALINE/INK: CPT | Performed by: INTERNAL MEDICINE

## 2024-10-01 PROCEDURE — 80053 COMPREHEN METABOLIC PANEL: CPT | Performed by: INTERNAL MEDICINE

## 2024-10-01 PROCEDURE — 90662 IIV NO PRSV INCREASED AG IM: CPT

## 2024-10-01 PROCEDURE — 85027 COMPLETE CBC AUTOMATED: CPT | Performed by: INTERNAL MEDICINE

## 2024-10-01 PROCEDURE — 81001 URINALYSIS AUTO W/SCOPE: CPT | Performed by: INTERNAL MEDICINE

## 2024-10-01 PROCEDURE — 36415 COLL VENOUS BLD VENIPUNCTURE: CPT | Performed by: INTERNAL MEDICINE

## 2024-10-01 PROCEDURE — G0008 ADMIN INFLUENZA VIRUS VAC: HCPCS

## 2024-10-01 ASSESSMENT — PAIN SCALES - GENERAL: PAINLEVEL: MODERATE PAIN (4)

## 2024-10-01 NOTE — PROGRESS NOTES
"  Assessment & Plan     Essential hypertension  Suboptimal control she will work on lifestyle changes getting home BPs done adequately but bradycardic.  In response to atenolol.  She could receive additional medication but would like to defer that    Abdominal pain, left lower quadrant  Nonspecific pain described as a tugging feeling it seems to be more pelvic could be related to bloating gas.  Although she does not have significant flatulence and has normal bowel motions.  Recommend CT abdomen pelvis.  Fully ovaries could not be visualized on the ultrasound.  Given location diverticulitis is possible.  If the CT normal could consider dicyclomine.  - UA Macroscopic with reflex to Microscopic and Culture - Lab Collect; Future  - Wet prep - Clinic Collect  - CT Abdomen Pelvis w Contrast; Future  - CBC with platelets; Future  - Comprehensive metabolic panel; Future  - UA Macroscopic with reflex to Microscopic and Culture - Lab Collect  - CBC with platelets  - Comprehensive metabolic panel  - UA Microscopic with Reflex to Culture    Encounter for screening for malignant neoplasm of colon  Tortuous colon so has not been able to do repeat colonoscopy.  Will get a Cologuard test done she knows that if it is positive she will get a colonoscopy.  This will be done in the hospital.  - COLOGUARD([employer]); Future                Subjective   Skylar is a 68 year old, presenting for the following health issues:  Abdominal Pain (Pt reports \"Increased symptoms of the pain that surfaces in lower left abdomen. We did the ultrasound past February but pain is now a type of \"tugging\" feeling at that location\". Pt states in the last 4 month pain is \"regular\" and intermittent shooting pain that \"cuts across abdomen\". ) and Blood Pressure Check (BP taken 2x)    Colon   Creatinine   Date Value Ref Range Status   02/05/2024 0.91 0.51 - 0.95 mg/dL Final           10/1/2024    12:48 PM   Additional Questions   Roomed by Sania " "    History of Present Illness       Reason for visit:  Pain in lower left abdomen    She eats 2-3 servings of fruits and vegetables daily.She consumes 1 sweetened beverage(s) daily.She exercises with enough effort to increase her heart rate 9 or less minutes per day.  She exercises with enough effort to increase her heart rate 3 or less days per week.   She is taking medications regularly.                     Objective    BP (!) 151/86 (BP Location: Right arm, Patient Position: Sitting, Cuff Size: Adult Regular)   Pulse 62   Temp 98.1  F (36.7  C) (Oral)   Resp 10   Ht 1.638 m (5' 4.49\")   Wt 60.8 kg (134 lb 1.6 oz)   LMP  (LMP Unknown)   SpO2 99%   BMI 22.67 kg/m    Body mass index is 22.67 kg/m .  Physical Exam   GENERAL: alert and no distress  NECK: no adenopathy, no asymmetry, masses, or scars  RESP: lungs clear to auscultation - no rales, rhonchi or wheezes  CV: regular rate and rhythm, normal S1 S2, no S3 or S4, no murmur, click or rub, no peripheral edema  ABDOMEN: soft, nontender, no hepatosplenomegaly, no masses and bowel sounds normal  MS: no gross musculoskeletal defects noted, no edema            Signed Electronically by: Palmira Wray MD    "

## 2024-10-02 LAB
ALBUMIN SERPL BCG-MCNC: 4.4 G/DL (ref 3.5–5.2)
ALP SERPL-CCNC: 60 U/L (ref 40–150)
ALT SERPL W P-5'-P-CCNC: 26 U/L (ref 0–50)
ANION GAP SERPL CALCULATED.3IONS-SCNC: 11 MMOL/L (ref 7–15)
AST SERPL W P-5'-P-CCNC: 30 U/L (ref 0–45)
BILIRUB SERPL-MCNC: 0.4 MG/DL
BUN SERPL-MCNC: 12 MG/DL (ref 8–23)
CALCIUM SERPL-MCNC: 10.1 MG/DL (ref 8.8–10.4)
CHLORIDE SERPL-SCNC: 100 MMOL/L (ref 98–107)
CREAT SERPL-MCNC: 0.83 MG/DL (ref 0.51–0.95)
EGFRCR SERPLBLD CKD-EPI 2021: 77 ML/MIN/1.73M2
GLUCOSE SERPL-MCNC: 96 MG/DL (ref 70–99)
HCO3 SERPL-SCNC: 26 MMOL/L (ref 22–29)
POTASSIUM SERPL-SCNC: 4.1 MMOL/L (ref 3.4–5.3)
PROT SERPL-MCNC: 7.2 G/DL (ref 6.4–8.3)
SODIUM SERPL-SCNC: 137 MMOL/L (ref 135–145)

## 2024-10-08 ENCOUNTER — HOSPITAL ENCOUNTER (OUTPATIENT)
Dept: CT IMAGING | Facility: HOSPITAL | Age: 69
Discharge: HOME | End: 2024-10-08
Attending: INTERNAL MEDICINE | Admitting: INTERNAL MEDICINE
Payer: COMMERCIAL

## 2024-10-08 DIAGNOSIS — R10.32 ABDOMINAL PAIN, LEFT LOWER QUADRANT: ICD-10-CM

## 2024-10-08 PROCEDURE — 74177 CT ABD & PELVIS W/CONTRAST: CPT

## 2024-10-08 PROCEDURE — 250N000011 HC RX IP 250 OP 636: Performed by: INTERNAL MEDICINE

## 2024-10-08 PROCEDURE — 250N000009 HC RX 250: Performed by: INTERNAL MEDICINE

## 2024-10-08 RX ORDER — IOPAMIDOL 755 MG/ML
66 INJECTION, SOLUTION INTRAVASCULAR ONCE
Status: COMPLETED | OUTPATIENT
Start: 2024-10-08 | End: 2024-10-08

## 2024-10-08 RX ADMIN — IOPAMIDOL 66 ML: 755 INJECTION, SOLUTION INTRAVENOUS at 15:42

## 2024-10-08 RX ADMIN — SODIUM CHLORIDE 90 ML: 9 INJECTION, SOLUTION INTRAVENOUS at 15:43

## 2024-10-11 DIAGNOSIS — N39.0 RECURRENT UTI: Primary | ICD-10-CM

## 2024-10-13 LAB — NONINV COLON CA DNA+OCC BLD SCRN STL QL: NEGATIVE

## 2024-10-18 ENCOUNTER — LAB (OUTPATIENT)
Dept: LAB | Facility: CLINIC | Age: 69
End: 2024-10-18
Payer: COMMERCIAL

## 2024-10-18 ENCOUNTER — TELEPHONE (OUTPATIENT)
Dept: INTERNAL MEDICINE | Facility: CLINIC | Age: 69
End: 2024-10-18

## 2024-10-18 DIAGNOSIS — N39.0 RECURRENT UTI: ICD-10-CM

## 2024-10-18 LAB
ALBUMIN UR-MCNC: NEGATIVE MG/DL
APPEARANCE UR: CLEAR
BACTERIA #/AREA URNS HPF: ABNORMAL /HPF
BILIRUB UR QL STRIP: NEGATIVE
COLOR UR AUTO: YELLOW
GLUCOSE UR STRIP-MCNC: NEGATIVE MG/DL
HGB UR QL STRIP: ABNORMAL
HYALINE CASTS #/AREA URNS LPF: ABNORMAL /LPF
KETONES UR STRIP-MCNC: NEGATIVE MG/DL
LEUKOCYTE ESTERASE UR QL STRIP: ABNORMAL
NITRATE UR QL: NEGATIVE
PH UR STRIP: 6 [PH] (ref 5–8)
RBC #/AREA URNS AUTO: ABNORMAL /HPF
SP GR UR STRIP: 1.01 (ref 1–1.03)
SQUAMOUS #/AREA URNS AUTO: ABNORMAL /LPF
UROBILINOGEN UR STRIP-ACNC: 0.2 E.U./DL
WBC #/AREA URNS AUTO: ABNORMAL /HPF

## 2024-10-18 PROCEDURE — 81001 URINALYSIS AUTO W/SCOPE: CPT

## 2024-10-18 NOTE — TELEPHONE ENCOUNTER
Urine did not show any infection . Only a few red cells and white cells noted So antibiotics are not needed   I would like her to repeat UA on return and if it still shows a few blood cells , would oren her with next steps

## 2024-10-18 NOTE — TELEPHONE ENCOUNTER
Patient contacting clinic regarding urinary symptoms and UA completed today:      Current symptoms pain/burning with urination however states it feels muscular and internal, pain in lower left abdomen (has been discussed with PCP), denies fevers.       Denies urgency or frequency.       Would like a return call with PCP recommendation or if prescription sent in.

## 2024-10-21 NOTE — TELEPHONE ENCOUNTER
Relayed provider  advisement to patient. Patient verbalized understanding.  No further questions at this time.

## 2024-11-19 ENCOUNTER — PATIENT OUTREACH (OUTPATIENT)
Dept: CARE COORDINATION | Facility: CLINIC | Age: 69
End: 2024-11-19
Payer: COMMERCIAL

## 2024-12-16 ENCOUNTER — TRANSFERRED RECORDS (OUTPATIENT)
Dept: HEALTH INFORMATION MANAGEMENT | Facility: CLINIC | Age: 69
End: 2024-12-16
Payer: COMMERCIAL

## 2024-12-17 ENCOUNTER — PATIENT OUTREACH (OUTPATIENT)
Dept: CARE COORDINATION | Facility: CLINIC | Age: 69
End: 2024-12-17
Payer: COMMERCIAL

## 2024-12-20 ENCOUNTER — LAB REQUISITION (OUTPATIENT)
Dept: LAB | Facility: CLINIC | Age: 69
End: 2024-12-20
Payer: COMMERCIAL

## 2024-12-20 DIAGNOSIS — Z12.4 ENCOUNTER FOR SCREENING FOR MALIGNANT NEOPLASM OF CERVIX: ICD-10-CM

## 2024-12-20 PROCEDURE — 87624 HPV HI-RISK TYP POOLED RSLT: CPT | Mod: ORL | Performed by: OBSTETRICS & GYNECOLOGY

## 2024-12-20 PROCEDURE — G0123 SCREEN CERV/VAG THIN LAYER: HCPCS | Mod: ORL | Performed by: OBSTETRICS & GYNECOLOGY

## 2024-12-23 LAB
HPV HR 12 DNA CVX QL NAA+PROBE: NEGATIVE
HPV16 DNA CVX QL NAA+PROBE: NEGATIVE
HPV18 DNA CVX QL NAA+PROBE: NEGATIVE
HUMAN PAPILLOMA VIRUS FINAL DIAGNOSIS: NORMAL

## 2024-12-24 DIAGNOSIS — E06.3 HYPOTHYROIDISM DUE TO HASHIMOTO'S THYROIDITIS: ICD-10-CM

## 2024-12-24 DIAGNOSIS — E78.5 HYPERLIPIDEMIA, UNSPECIFIED HYPERLIPIDEMIA TYPE: ICD-10-CM

## 2024-12-24 RX ORDER — LEVOTHYROXINE SODIUM 50 UG/1
TABLET ORAL
Qty: 90 TABLET | Refills: 3 | OUTPATIENT
Start: 2024-12-24

## 2024-12-24 RX ORDER — ROSUVASTATIN CALCIUM 5 MG/1
5 TABLET, COATED ORAL DAILY
Qty: 90 TABLET | Refills: 3 | OUTPATIENT
Start: 2024-12-24

## 2024-12-27 LAB
BKR AP ASSOCIATED HPV REPORT: NORMAL
BKR LAB AP GYN ADEQUACY: NORMAL
BKR LAB AP GYN INTERPRETATION: NORMAL
BKR LAB AP LMP: NORMAL
BKR LAB AP PREVIOUS ABNL DX: NORMAL
BKR LAB AP PREVIOUS ABNORMAL: NORMAL
PATH REPORT.COMMENTS IMP SPEC: NORMAL
PATH REPORT.COMMENTS IMP SPEC: NORMAL
PATH REPORT.RELEVANT HX SPEC: NORMAL

## 2025-02-11 ENCOUNTER — OFFICE VISIT (OUTPATIENT)
Dept: INTERNAL MEDICINE | Facility: CLINIC | Age: 70
End: 2025-02-11
Attending: INTERNAL MEDICINE
Payer: COMMERCIAL

## 2025-02-11 VITALS
WEIGHT: 125.4 LBS | HEART RATE: 66 BPM | HEIGHT: 64 IN | RESPIRATION RATE: 12 BRPM | OXYGEN SATURATION: 97 % | DIASTOLIC BLOOD PRESSURE: 74 MMHG | TEMPERATURE: 97.2 F | BODY MASS INDEX: 21.41 KG/M2 | SYSTOLIC BLOOD PRESSURE: 125 MMHG

## 2025-02-11 DIAGNOSIS — E06.3 HYPOTHYROIDISM DUE TO HASHIMOTO'S THYROIDITIS: ICD-10-CM

## 2025-02-11 DIAGNOSIS — E78.2 MIXED HYPERLIPIDEMIA: ICD-10-CM

## 2025-02-11 DIAGNOSIS — E55.9 VITAMIN D DEFICIENCY: ICD-10-CM

## 2025-02-11 DIAGNOSIS — E27.8 ADRENAL INCIDENTALOMA: ICD-10-CM

## 2025-02-11 DIAGNOSIS — R79.9 ABNORMAL BLOOD CHEMISTRY: ICD-10-CM

## 2025-02-11 DIAGNOSIS — R10.32 LEFT LOWER QUADRANT PAIN: ICD-10-CM

## 2025-02-11 DIAGNOSIS — E78.5 HYPERLIPIDEMIA, UNSPECIFIED HYPERLIPIDEMIA TYPE: Primary | ICD-10-CM

## 2025-02-11 DIAGNOSIS — Z17.0 MALIGNANT NEOPLASM OF CENTRAL PORTION OF LEFT BREAST IN FEMALE, ESTROGEN RECEPTOR POSITIVE (H): ICD-10-CM

## 2025-02-11 DIAGNOSIS — Z00.00 HEALTH MAINTENANCE EXAMINATION: ICD-10-CM

## 2025-02-11 DIAGNOSIS — N39.0 RECURRENT UTI: ICD-10-CM

## 2025-02-11 DIAGNOSIS — C50.112 MALIGNANT NEOPLASM OF CENTRAL PORTION OF LEFT BREAST IN FEMALE, ESTROGEN RECEPTOR POSITIVE (H): ICD-10-CM

## 2025-02-11 DIAGNOSIS — H81.10 BENIGN PAROXYSMAL POSITIONAL VERTIGO, UNSPECIFIED LATERALITY: ICD-10-CM

## 2025-02-11 PROBLEM — U07.1 INFECTION DUE TO 2019 NOVEL CORONAVIRUS: Status: RESOLVED | Noted: 2022-02-01 | Resolved: 2025-02-11

## 2025-02-11 LAB
ALBUMIN SERPL BCG-MCNC: 4.4 G/DL (ref 3.5–5.2)
ALBUMIN UR-MCNC: NEGATIVE MG/DL
ALP SERPL-CCNC: 56 U/L (ref 40–150)
ALT SERPL W P-5'-P-CCNC: 20 U/L (ref 0–50)
ANION GAP SERPL CALCULATED.3IONS-SCNC: 13 MMOL/L (ref 7–15)
APPEARANCE UR: CLEAR
AST SERPL W P-5'-P-CCNC: 26 U/L (ref 0–45)
BACTERIA #/AREA URNS HPF: ABNORMAL /HPF
BASOPHILS # BLD AUTO: 0 10E3/UL (ref 0–0.2)
BASOPHILS NFR BLD AUTO: 1 %
BILIRUB SERPL-MCNC: 0.5 MG/DL
BILIRUB UR QL STRIP: NEGATIVE
BUN SERPL-MCNC: 11.9 MG/DL (ref 8–23)
CALCIUM SERPL-MCNC: 9.7 MG/DL (ref 8.8–10.4)
CHLORIDE SERPL-SCNC: 102 MMOL/L (ref 98–107)
CHOLEST SERPL-MCNC: 204 MG/DL
COLOR UR AUTO: YELLOW
CREAT SERPL-MCNC: 0.92 MG/DL (ref 0.51–0.95)
EGFRCR SERPLBLD CKD-EPI 2021: 67 ML/MIN/1.73M2
EOSINOPHIL # BLD AUTO: 0.1 10E3/UL (ref 0–0.7)
EOSINOPHIL NFR BLD AUTO: 1 %
ERYTHROCYTE [DISTWIDTH] IN BLOOD BY AUTOMATED COUNT: 12.9 % (ref 10–15)
EST. AVERAGE GLUCOSE BLD GHB EST-MCNC: 123 MG/DL
FASTING STATUS PATIENT QL REPORTED: ABNORMAL
FASTING STATUS PATIENT QL REPORTED: ABNORMAL
GLUCOSE SERPL-MCNC: 101 MG/DL (ref 70–99)
GLUCOSE UR STRIP-MCNC: NEGATIVE MG/DL
HBA1C MFR BLD: 5.9 % (ref 0–5.6)
HCO3 SERPL-SCNC: 26 MMOL/L (ref 22–29)
HCT VFR BLD AUTO: 43.6 % (ref 35–47)
HDLC SERPL-MCNC: 68 MG/DL
HGB BLD-MCNC: 13.4 G/DL (ref 11.7–15.7)
HGB UR QL STRIP: ABNORMAL
IMM GRANULOCYTES # BLD: 0 10E3/UL
IMM GRANULOCYTES NFR BLD: 0 %
KETONES UR STRIP-MCNC: ABNORMAL MG/DL
LDLC SERPL CALC-MCNC: 116 MG/DL
LEUKOCYTE ESTERASE UR QL STRIP: NEGATIVE
LYMPHOCYTES # BLD AUTO: 1.8 10E3/UL (ref 0.8–5.3)
LYMPHOCYTES NFR BLD AUTO: 42 %
MCH RBC QN AUTO: 29.4 PG (ref 26.5–33)
MCHC RBC AUTO-ENTMCNC: 30.7 G/DL (ref 31.5–36.5)
MCV RBC AUTO: 96 FL (ref 78–100)
MONOCYTES # BLD AUTO: 0.4 10E3/UL (ref 0–1.3)
MONOCYTES NFR BLD AUTO: 10 %
MUCOUS THREADS #/AREA URNS LPF: PRESENT /LPF
NEUTROPHILS # BLD AUTO: 2 10E3/UL (ref 1.6–8.3)
NEUTROPHILS NFR BLD AUTO: 47 %
NITRATE UR QL: NEGATIVE
NONHDLC SERPL-MCNC: 136 MG/DL
PH UR STRIP: 6 [PH] (ref 5–8)
PLATELET # BLD AUTO: 238 10E3/UL (ref 150–450)
POTASSIUM SERPL-SCNC: 3.7 MMOL/L (ref 3.4–5.3)
PROT SERPL-MCNC: 7.1 G/DL (ref 6.4–8.3)
RBC # BLD AUTO: 4.56 10E6/UL (ref 3.8–5.2)
RBC #/AREA URNS AUTO: ABNORMAL /HPF
SODIUM SERPL-SCNC: 141 MMOL/L (ref 135–145)
SP GR UR STRIP: >=1.03 (ref 1–1.03)
TRIGL SERPL-MCNC: 102 MG/DL
TSH SERPL DL<=0.005 MIU/L-ACNC: 1.21 UIU/ML (ref 0.3–4.2)
UROBILINOGEN UR STRIP-ACNC: 0.2 E.U./DL
VIT D+METAB SERPL-MCNC: 61 NG/ML (ref 20–50)
WBC # BLD AUTO: 4.3 10E3/UL (ref 4–11)
WBC #/AREA URNS AUTO: ABNORMAL /HPF

## 2025-02-11 PROCEDURE — 99214 OFFICE O/P EST MOD 30 MIN: CPT | Mod: 25 | Performed by: INTERNAL MEDICINE

## 2025-02-11 PROCEDURE — G2211 COMPLEX E/M VISIT ADD ON: HCPCS | Performed by: INTERNAL MEDICINE

## 2025-02-11 PROCEDURE — 80061 LIPID PANEL: CPT | Performed by: INTERNAL MEDICINE

## 2025-02-11 PROCEDURE — 80053 COMPREHEN METABOLIC PANEL: CPT | Performed by: INTERNAL MEDICINE

## 2025-02-11 PROCEDURE — G0439 PPPS, SUBSEQ VISIT: HCPCS | Performed by: INTERNAL MEDICINE

## 2025-02-11 PROCEDURE — 83036 HEMOGLOBIN GLYCOSYLATED A1C: CPT | Performed by: INTERNAL MEDICINE

## 2025-02-11 PROCEDURE — 82306 VITAMIN D 25 HYDROXY: CPT | Performed by: INTERNAL MEDICINE

## 2025-02-11 PROCEDURE — 84443 ASSAY THYROID STIM HORMONE: CPT | Performed by: INTERNAL MEDICINE

## 2025-02-11 PROCEDURE — 85025 COMPLETE CBC W/AUTO DIFF WBC: CPT | Performed by: INTERNAL MEDICINE

## 2025-02-11 PROCEDURE — 81001 URINALYSIS AUTO W/SCOPE: CPT | Performed by: INTERNAL MEDICINE

## 2025-02-11 PROCEDURE — 36415 COLL VENOUS BLD VENIPUNCTURE: CPT | Performed by: INTERNAL MEDICINE

## 2025-02-11 RX ORDER — ROSUVASTATIN CALCIUM 5 MG/1
5 TABLET, COATED ORAL DAILY
Qty: 90 TABLET | Refills: 3 | Status: SHIPPED | OUTPATIENT
Start: 2025-02-11

## 2025-02-11 RX ORDER — ATENOLOL 25 MG/1
25 TABLET ORAL DAILY
Qty: 90 TABLET | Refills: 3 | Status: SHIPPED | OUTPATIENT
Start: 2025-02-11

## 2025-02-11 RX ORDER — LEVOTHYROXINE SODIUM 50 UG/1
TABLET ORAL
Qty: 90 TABLET | Refills: 3 | Status: SHIPPED | OUTPATIENT
Start: 2025-02-11

## 2025-02-11 RX ORDER — ATENOLOL 25 MG/1
12.5 TABLET ORAL DAILY
Qty: 90 TABLET | Refills: 3 | Status: SHIPPED | OUTPATIENT
Start: 2025-02-11 | End: 2025-02-11

## 2025-02-11 SDOH — HEALTH STABILITY: PHYSICAL HEALTH: ON AVERAGE, HOW MANY DAYS PER WEEK DO YOU ENGAGE IN MODERATE TO STRENUOUS EXERCISE (LIKE A BRISK WALK)?: 1 DAY

## 2025-02-11 SDOH — HEALTH STABILITY: PHYSICAL HEALTH: ON AVERAGE, HOW MANY MINUTES DO YOU ENGAGE IN EXERCISE AT THIS LEVEL?: 30 MIN

## 2025-02-11 ASSESSMENT — SOCIAL DETERMINANTS OF HEALTH (SDOH): HOW OFTEN DO YOU GET TOGETHER WITH FRIENDS OR RELATIVES?: ONCE A WEEK

## 2025-02-11 NOTE — PATIENT INSTRUCTIONS
Bone density scan due 2026   Cologard 2027   Total calcium in supplements should not exceed 1000mg   Total daily need is 1200mg   Vit d is calibrated to blood level

## 2025-02-11 NOTE — PROGRESS NOTES
Preventive Care Visit  [facility]  Palmira Wray MD, Internal Medicine  Feb 11, 2025      Assessment & Plan     Hyperlipidemia, unspecified hyperlipidemia type  Has been on rosuvastatin for decades.  She could have prediabetes due to the statin use.  However continues to have slightly high LDL recent coronary calcium score was 0 so we opted not to increase the statin dose she has no other personal risk factors for heart disease.  - Lipid panel reflex to direct LDL Non-fasting; Future    Hypothyroidism due to Hashimoto's thyroiditis  TSH   Date Value Ref Range Status   02/05/2024 1.36 0.30 - 4.20 uIU/mL Final   11/29/2021 1.59 0.30 - 5.00 uIU/mL Final     Well-controlled  - TSH WITH FREE T4 REFLEX; Future  - TSH; Future    Malignant neoplasm of central portion of left breast in female, estrogen receptor positive (H)  Was on tamoxifen    in remission    Health maintenance examination  Cologard 2024  Mammo annually   Pap annual by gyn  Dexa 2023 osteopenia       Benign mass of right adrenal gland  Incidental finding but could consider 24 hour     Mixed hyperlipidemia  On low dose statin   - Comprehensive metabolic panel; Future  - Lipid panel reflex to direct LDL Fasting; Future  - CBC with Platelets & Differential; Future    Abnormal blood chemistry    - Hemoglobin A1c; Future    Vitamin D deficiency  Last levels were high . Talked about reducing vit d intake   - Vitamin D Deficiency; Future    Atenolol is taking half a day   Has white coat HTN     Has a history of left lower quadrant pain.  This was discussed in detail CT scan abdomen did not show any abnormality.  Except noninflamed diverticuli.  Cologuard testing was negative.  Stopping knots did help actually the pain go away.  We discussed how this could be referred pain from the lower back to back exercises given to patient.    H/o BPPV : does have good insight about this , typically triggered by rapid head position did not give her  information to read about this.  She also has an adrenal incidentaloma will do a one-time 24-hour urine test.  Counseling  Appropriate preventive services were addressed with this patient via screening, questionnaire, or discussion as appropriate for fall prevention, nutrition, physical activity, Tobacco-use cessation, social engagement, weight loss and cognition.  Checklist reviewing preventive services available has been given to the patient.  Reviewed patient's diet, addressing concerns and/or questions.   She is at risk for lack of exercise and has been provided with information to increase physical activity for the benefit of her well-being.     The longitudinal plan of care for the diagnosis(es)/condition(s) as documented were addressed during this visit. Due to the added complexity in care, I will continue to support Skylar in the subsequent management and with ongoing continuity of care.        Subjective   Skylar is a 69 year old, presenting for the following:  Physical (Annual medicare wellness check. Fasting today too) and Discussion (Discuss about colon related issue that was previously discussed last years physical time. )        2/11/2025     9:02 AM   Additional Questions   Roomed by Melanie   Accompanied by James           HPI          Current Outpatient Medications   Medication Sig Dispense Refill    aspirin 81 MG EC tablet [ASPIRIN 81 MG EC TABLET] Take 81 mg by mouth daily.      atenolol (TENORMIN) 25 MG tablet Take 0.5 tablets (12.5 mg) by mouth daily 90 tablet 3    calcium carbonate-vitamin D2 500 mg(1,250mg) -200 unit tablet [CALCIUM CARBONATE-VITAMIN D2 500 MG(1,250MG) -200 UNIT TABLET] Take 1 tablet by mouth 3 (three) times a day.      cholecalciferol, vitamin D3, (VITAMIN D3) 2,000 unit cap [CHOLECALCIFEROL, VITAMIN D3, (VITAMIN D3) 2,000 UNIT CAP] Take 1,000 Units by mouth daily.       levothyroxine (SYNTHROID/LEVOTHROID) 50 MCG tablet TAKE 1 TABLET(50 MCG) BY MOUTH DAILY 90 tablet 3     multivitamin therapeutic (THERAGRAN) tablet [MULTIVITAMIN THERAPEUTIC (THERAGRAN) TABLET] Take 1 tablet by mouth daily.      rosuvastatin (CRESTOR) 5 MG tablet Take 1 tablet (5 mg) by mouth daily 90 tablet 3    Saccharomyces boulardii (FLORASTOR) 250 mg capsule [SACCHAROMYCES BOULARDII (FLORASTOR) 250 MG CAPSULE] Take 250 mg by mouth daily.       No current facility-administered medications for this visit.       Health Care Directive  Patient does not have a Health Care Directive: Patient states has Advance Directive and will bring in a copy to clinic.      2/11/2025   General Health   How would you rate your overall physical health? Excellent   Feel stress (tense, anxious, or unable to sleep) Not at all         2/11/2025   Nutrition   Diet: Regular (no restrictions)         2/11/2025   Exercise   Days per week of moderate/strenous exercise 1 day   Average minutes spent exercising at this level 30 min   (!) EXERCISE CONCERN      2/11/2025   Social Factors   Frequency of gathering with friends or relatives Once a week   Worry food won't last until get money to buy more No   Food not last or not have enough money for food? No   Do you have housing? (Housing is defined as stable permanent housing and does not include staying ouside in a car, in a tent, in an abandoned building, in an overnight shelter, or couch-surfing.) Yes   Are you worried about losing your housing? No   Lack of transportation? No   Unable to get utilities (heat,electricity)? No         2/11/2025   Fall Risk   Fallen 2 or more times in the past year? No     No    No   Trouble with walking or balance? No     No    No       Proxy-reported    Multiple values from one day are sorted in reverse-chronological order          2/11/2025   Activities of Daily Living- Home Safety   Needs help with the following daily activites None of the above   Safety concerns in the home None of the above         2/11/2025   Dental   Dentist two times every year? Yes          2/11/2025   Hearing Screening   Hearing concerns? None of the above         2/11/2025   Driving Risk Screening   Patient/family members have concerns about driving No         2/11/2025   General Alertness/Fatigue Screening   Have you been more tired than usual lately? No         2/11/2025   Urinary Incontinence Screening   Bothered by leaking urine in past 6 months No         2/2/2024   TB Screening   Were you born outside of the [place]? No         Today's PHQ-2 Score:       2/11/2025     7:46 AM   PHQ-2 ( 1999 [employer])   Q1: Little interest or pleasure in doing things 0   Q2: Feeling down, depressed or hopeless 0   PHQ-2 Score 0    Q1: Little interest or pleasure in doing things Not at all   Q2: Feeling down, depressed or hopeless Not at all   PHQ-2 Score 0       Patient-reported           2/11/2025   Substance Use   Alcohol more than 3/day or more than 7/wk Not Applicable   Do you have a current opioid prescription? No   How severe/bad is pain from 1 to 10? 0/10 (No Pain)   Do you use any other substances recreationally? No    (!) PRESCRIPTION DRUGS       Multiple values from one day are sorted in reverse-chronological order     Social History     Tobacco Use    Smoking status: Never    Smokeless tobacco: Never   Vaping Use    Vaping status: Never Used   Substance Use Topics    Alcohol use: No    Drug use: No           2/1/2023   LAST FHS-7 RESULTS   1st degree relative breast or ovarian cancer Yes   Any relative bilateral breast cancer No   Any male have breast cancer No   Any ONE woman have BOTH breast AND ovarian cancer No   Any woman with breast cancer before 50yrs No   2 or more relatives with breast AND/OR ovarian cancer No   2 or more relatives with breast AND/OR bowel cancer Yes              History of abnormal Pap smear: Continues to get Pap smears by her gynecologist        Latest Ref Rng & Units 12/20/2024     9:23 AM 12/19/2023     2:32 PM 12/19/2022     3:33 PM   PAP / HPV   PAP  Negative for  Intraepithelial Lesion or Malignancy (NILM)  Negative for Intraepithelial Lesion or Malignancy (NILM)  Negative for Intraepithelial Lesion or Malignancy (NILM)    HPV 16 DNA Negative Negative  Negative  Negative    HPV 18 DNA Negative Negative  Negative  Negative    Other HR HPV Negative Negative  Negative  Negative      ASCVD Risk   The 10-year ASCVD risk score (Ron ART, et al., 2019) is: 9.6%    Values used to calculate the score:      Age: 69 years      Sex: Female      Is Non- : No      Diabetic: No      Tobacco smoker: No      Systolic Blood Pressure: 125 mmHg      Is BP treated: Yes      HDL Cholesterol: 63 mg/dL      Total Cholesterol: 209 mg/dL            Reviewed and updated as needed this visit by Provider                    Current Outpatient Medications   Medication Sig Dispense Refill    aspirin 81 MG EC tablet [ASPIRIN 81 MG EC TABLET] Take 81 mg by mouth daily.      atenolol (TENORMIN) 25 MG tablet Take 1 tablet (25 mg) by mouth daily. 90 tablet 3    calcium carbonate-vitamin D2 500 mg(1,250mg) -200 unit tablet [CALCIUM CARBONATE-VITAMIN D2 500 MG(1,250MG) -200 UNIT TABLET] Take 1 tablet by mouth 3 (three) times a day.      cholecalciferol, vitamin D3, (VITAMIN D3) 2,000 unit cap [CHOLECALCIFEROL, VITAMIN D3, (VITAMIN D3) 2,000 UNIT CAP] Take 1,000 Units by mouth daily.       levothyroxine (SYNTHROID/LEVOTHROID) 50 MCG tablet TAKE 1 TABLET(50 MCG) BY MOUTH DAILY 90 tablet 3    multivitamin therapeutic (THERAGRAN) tablet [MULTIVITAMIN THERAPEUTIC (THERAGRAN) TABLET] Take 1 tablet by mouth daily.      rosuvastatin (CRESTOR) 5 MG tablet Take 1 tablet (5 mg) by mouth daily. 90 tablet 3    Saccharomyces boulardii (FLORASTOR) 250 mg capsule [SACCHAROMYCES BOULARDII (FLORASTOR) 250 MG CAPSULE] Take 250 mg by mouth daily.       No current facility-administered medications for this visit.       Current providers sharing in care for this patient include:  Patient Care  "Team:  Palmira Wray MD as PCP - General (Internal Medicine)  Palmira Wray MD as Assigned PCP    The following health maintenance items are reviewed in Epic and correct as of today:  Health Maintenance   Topic Date Due    COVID-19 Vaccine (4 - 2024-25 season) 09/01/2024    LIPID  02/05/2025    ANNUAL REVIEW OF HM ORDERS  02/05/2025    TSH W/FREE T4 REFLEX  02/05/2025    BMP  10/01/2025    MEDICARE ANNUAL WELLNESS VISIT  12/20/2025    MAMMO SCREENING  12/20/2025    FALL RISK ASSESSMENT  02/11/2026    GLUCOSE  10/01/2027    ADVANCE CARE PLANNING  02/05/2029    RSV VACCINE (1 - 1-dose 75+ series) 11/22/2031    DTAP/TDAP/TD IMMUNIZATION (6 - Td or Tdap) 02/05/2034    COLORECTAL CANCER SCREENING  10/07/2034    DEXA  04/11/2038    HEPATITIS C SCREENING  Completed    PHQ-2 (once per calendar year)  Completed    INFLUENZA VACCINE  Completed    Pneumococcal Vaccine: 50+ Years  Completed    ZOSTER IMMUNIZATION  Completed    HPV IMMUNIZATION  Aged Out    MENINGITIS IMMUNIZATION  Aged Out    PAP  Discontinued            Objective    Exam  /74 (BP Location: Right arm, Patient Position: Sitting, Cuff Size: Adult Regular)   Pulse 66   Temp 97.2  F (36.2  C) (Oral)   Resp 12   Ht 1.63 m (5' 4.17\")   Wt 56.9 kg (125 lb 6.4 oz)   LMP  (LMP Unknown)   SpO2 97%   BMI 21.41 kg/m     Estimated body mass index is 21.41 kg/m  as calculated from the following:    Height as of this encounter: 1.63 m (5' 4.17\").    Weight as of this encounter: 56.9 kg (125 lb 6.4 oz).    Physical Exam  GENERAL: alert and no distress  HENT: ear canals and TM's normal, nose and mouth without ulcers or lesions  NECK: no adenopathy, no asymmetry, masses, or scars  RESP: lungs clear to auscultation - no rales, rhonchi or wheezes  CV: regular rate and rhythm, normal S1 S2, no S3 or S4, no murmur, click or rub, no peripheral edema  ABDOMEN: soft, nontender, no hepatosplenomegaly, no masses and bowel sounds normal  MS: no gross musculoskeletal " defects noted, no edema        2/11/2025   Mini Cog   Clock Draw Score 2 Normal   3 Item Recall 3 objects recalled   Mini Cog Total Score 5              Signed Electronically by: Palmira Wray MD

## 2025-02-27 DIAGNOSIS — R31.29 MICROSCOPIC HEMATURIA: Primary | ICD-10-CM

## 2025-04-10 ENCOUNTER — TELEPHONE (OUTPATIENT)
Dept: INTERNAL MEDICINE | Facility: CLINIC | Age: 70
End: 2025-04-10
Payer: COMMERCIAL

## 2025-04-10 NOTE — TELEPHONE ENCOUNTER
General Call      Reason for Call:  Patient is asking for instructions for a particular urine test and is wondering if she should follow through with the appointment with the urologist.     Date of last appointment with provider: 2/11/2025    Could we send this information to you in [place] or would you prefer to receive a phone call?:   Patient would prefer a phone call     Okay to leave a detailed message?: Yes at Cell number on file:    Telephone Information:   Mobile [phone]

## 2025-04-14 NOTE — TELEPHONE ENCOUNTER
Call return to patient to discuss message below - she reports at her last visit 2/11/25, Dr. Wray order a Cortisol Cortisone Free 24 Hour Urine. However she wasn't instructed on how to collect urine. Patient is looking for clarification from lab department on proper collection.     A Urology referral was also placed and patient is wondering if she should hold off until the results of 24 hour urine testing. Provider please review and advise on your recommendations, thank you.

## 2025-04-15 NOTE — TELEPHONE ENCOUNTER
Communication sent via [employer] message to patient per her request from yesterday phone conversation.

## 2025-04-15 NOTE — TELEPHONE ENCOUNTER
Urology referral is for the small amounts of blood in the urine   24 hour urine is hormonal testing because of the adrenal adenma so two different things so she still needs urology

## 2025-04-27 LAB
CATECHOLS UR-IMP: NORMAL
CREAT 24H UR-MRATE: 882 MG/D
CREAT UR-MCNC: 42 MG/DL
DOPAMINE 24H UR-MRATE: 143 UG/D
DOPAMINE UR-MCNC: 68 UG/L
DOPAMINE/CREAT UR: 162 UG/G CRT
EPINEPH 24H UR-MRATE: 4 UG/D
EPINEPH UR-MCNC: 2 UG/L
EPINEPH/CREAT UR: 5 UG/G CRT
NOREPINEPH 24H UR-MRATE: 40 UG/D
NOREPINEPH UR-MCNC: 19 UG/L
NOREPINEPH/CREAT UR: 45 UG/G CRT

## 2025-04-29 ENCOUNTER — OFFICE VISIT (OUTPATIENT)
Dept: UROLOGY | Facility: CLINIC | Age: 70
End: 2025-04-29
Attending: INTERNAL MEDICINE
Payer: COMMERCIAL

## 2025-04-29 VITALS
BODY MASS INDEX: 22.53 KG/M2 | HEART RATE: 71 BPM | OXYGEN SATURATION: 98 % | WEIGHT: 132 LBS | HEIGHT: 64 IN | DIASTOLIC BLOOD PRESSURE: 78 MMHG | SYSTOLIC BLOOD PRESSURE: 133 MMHG

## 2025-04-29 DIAGNOSIS — R31.29 MICROSCOPIC HEMATURIA: ICD-10-CM

## 2025-04-29 PROCEDURE — 1126F AMNT PAIN NOTED NONE PRSNT: CPT | Performed by: STUDENT IN AN ORGANIZED HEALTH CARE EDUCATION/TRAINING PROGRAM

## 2025-04-29 PROCEDURE — 99203 OFFICE O/P NEW LOW 30 MIN: CPT | Performed by: STUDENT IN AN ORGANIZED HEALTH CARE EDUCATION/TRAINING PROGRAM

## 2025-04-29 PROCEDURE — 3078F DIAST BP <80 MM HG: CPT | Performed by: STUDENT IN AN ORGANIZED HEALTH CARE EDUCATION/TRAINING PROGRAM

## 2025-04-29 PROCEDURE — 3075F SYST BP GE 130 - 139MM HG: CPT | Performed by: STUDENT IN AN ORGANIZED HEALTH CARE EDUCATION/TRAINING PROGRAM

## 2025-04-29 ASSESSMENT — PAIN SCALES - GENERAL: PAINLEVEL_OUTOF10: NO PAIN (0)

## 2025-04-29 NOTE — NURSING NOTE
"Chief Complaint   Patient presents with    Hematuria     Hasn't seen blood in her urine. No other symptoms           Vitals:    04/29/25 0750   BP: 133/78   BP Location: Right arm   Patient Position: Sitting   Cuff Size: Adult Regular   Pulse: 71   SpO2: 98%   Weight: 59.9 kg (132 lb)   Height: 1.628 m (5' 4.1\")     Wt Readings from Last 1 Encounters:   04/29/25 59.9 kg (132 lb)       Lea NEGRON CMA.................4/29/2025    "

## 2025-04-30 LAB
COLLECT DURATION TIME UR: 24 H
CORTIS 24H UR-MRATE: 15 MCG/24 H (ref 3.5–45)
CORTISONE 24H UR-MRATE: 57 MCG/24 H (ref 17–129)
SPECIMEN VOL 24H UR: 2100 ML

## 2025-05-06 ENCOUNTER — TELEPHONE (OUTPATIENT)
Dept: INTERNAL MEDICINE | Facility: CLINIC | Age: 70
End: 2025-05-06
Payer: COMMERCIAL

## 2025-05-06 NOTE — TELEPHONE ENCOUNTER
Patient calling for advisement on diverticulitis diet.  Reports for the last year she has had on and off left lower abdominal pain.  Patient reports she had a CT on 10/8/24 that found diverticuli without inflammation. Reports on Sunday she woke up with piercing lower left abdominal pain and had some mild diarrhea.  Reports pain improved after a couple of hours.  Reports she started a clear liquid diet and will advance as tolerated.  Writer reviewed diverticulitis diet recommendations with patient.  Patient is not having symptoms at this time.  If symptoms return, will call and schedule appt with PCP.

## 2025-05-12 ENCOUNTER — OFFICE VISIT (OUTPATIENT)
Dept: INTERNAL MEDICINE | Facility: CLINIC | Age: 70
End: 2025-05-12
Payer: COMMERCIAL

## 2025-05-12 VITALS
BODY MASS INDEX: 21.29 KG/M2 | DIASTOLIC BLOOD PRESSURE: 73 MMHG | WEIGHT: 124.7 LBS | TEMPERATURE: 98.5 F | OXYGEN SATURATION: 97 % | SYSTOLIC BLOOD PRESSURE: 117 MMHG | HEIGHT: 64 IN | RESPIRATION RATE: 15 BRPM | HEART RATE: 79 BPM

## 2025-05-12 DIAGNOSIS — R31.29 MICROSCOPIC HEMATURIA: ICD-10-CM

## 2025-05-12 DIAGNOSIS — R10.32 ABDOMINAL PAIN, LEFT LOWER QUADRANT: Primary | ICD-10-CM

## 2025-05-12 DIAGNOSIS — K57.32 DIVERTICULITIS OF COLON: ICD-10-CM

## 2025-05-12 DIAGNOSIS — D35.00 ADRENAL ADENOMA, UNSPECIFIED LATERALITY: ICD-10-CM

## 2025-05-12 PROCEDURE — 3074F SYST BP LT 130 MM HG: CPT | Performed by: INTERNAL MEDICINE

## 2025-05-12 PROCEDURE — 3078F DIAST BP <80 MM HG: CPT | Performed by: INTERNAL MEDICINE

## 2025-05-12 PROCEDURE — 99214 OFFICE O/P EST MOD 30 MIN: CPT | Performed by: INTERNAL MEDICINE

## 2025-05-12 RX ORDER — DICYCLOMINE HCL 20 MG
20 TABLET ORAL EVERY 6 HOURS
Qty: 30 TABLET | Refills: 1 | Status: SHIPPED | OUTPATIENT
Start: 2025-05-12

## 2025-06-11 ENCOUNTER — OFFICE VISIT (OUTPATIENT)
Dept: UROLOGY | Facility: CLINIC | Age: 70
End: 2025-06-11
Payer: COMMERCIAL

## 2025-06-11 VITALS — TEMPERATURE: 95.9 F | SYSTOLIC BLOOD PRESSURE: 150 MMHG | DIASTOLIC BLOOD PRESSURE: 73 MMHG | HEART RATE: 73 BPM

## 2025-06-11 DIAGNOSIS — R31.29 MICROSCOPIC HEMATURIA: Primary | ICD-10-CM

## 2025-06-11 RX ORDER — CEPHALEXIN 500 MG/1
500 CAPSULE ORAL ONCE
Status: COMPLETED | OUTPATIENT
Start: 2025-06-11 | End: 2025-06-11

## 2025-06-11 RX ADMIN — CEPHALEXIN 500 MG: 500 CAPSULE ORAL at 13:08

## 2025-06-11 ASSESSMENT — PAIN SCALES - GENERAL: PAINLEVEL_OUTOF10: NO PAIN (0)

## 2025-06-11 NOTE — PROGRESS NOTES
Patient educated regarding cystoscopy procedure and possible symptoms after completion.  Patient voiced understanding of information.  Handout given to patient.  Consent form signed.  Cheryl Leon RN

## 2025-06-11 NOTE — PROGRESS NOTES
Cystoscopy notes    Pre-procedure diagnosis: Microscopic hematuria  Post procedure diagnosis: normal cystoscopy  Procedure performed: cystoscopy  Surgeon: Isiah James MD  Anesthesia: local    Indications for procedure: Patient is a 69 year old year old female with a history of hematuria.  Here today for cysto    Description of procedure: After fully informed voluntary consent was obtained patient was brought into the procedure room, identified and placed in a supine position on the cysto table.  The vagina/intraoitus were prepped and draped in a sterile fashion with betadine.  A 15F flexible cystoscope was inserted into the urethra and the bladder and urethra examined in a systematic manner.  There were no tumor stones or diverticula.  Ureteric orifices were normal in position and number and effluxing clear urine.   Distal urethra was normal.  The patient tolerated the procedure well and there were no complications.      Assessment/Plan: Patient with a history of hematuria with negative cystoscopy.  Follow up as needed.    Isiah James MD